# Patient Record
Sex: MALE | Race: WHITE | Employment: OTHER | URBAN - METROPOLITAN AREA
[De-identification: names, ages, dates, MRNs, and addresses within clinical notes are randomized per-mention and may not be internally consistent; named-entity substitution may affect disease eponyms.]

---

## 2017-01-01 ENCOUNTER — APPOINTMENT (OUTPATIENT)
Dept: CT IMAGING | Age: 82
DRG: 682 | End: 2017-01-01
Attending: EMERGENCY MEDICINE
Payer: MEDICARE

## 2017-01-01 ENCOUNTER — APPOINTMENT (OUTPATIENT)
Dept: GENERAL RADIOLOGY | Age: 82
DRG: 682 | End: 2017-01-01
Attending: EMERGENCY MEDICINE
Payer: MEDICARE

## 2017-01-01 ENCOUNTER — HOSPITAL ENCOUNTER (INPATIENT)
Age: 82
LOS: 4 days | Discharge: HOSPICE | DRG: 682 | End: 2017-05-27
Attending: EMERGENCY MEDICINE | Admitting: INTERNAL MEDICINE
Payer: MEDICARE

## 2017-01-01 ENCOUNTER — HOSPITAL ENCOUNTER (INPATIENT)
Age: 82
LOS: 9 days | End: 2017-06-05
Attending: INTERNAL MEDICINE | Admitting: INTERNAL MEDICINE

## 2017-01-01 ENCOUNTER — HOSPICE ADMISSION (OUTPATIENT)
Dept: HOSPICE | Facility: HOSPICE | Age: 82
End: 2017-01-01
Payer: MEDICARE

## 2017-01-01 ENCOUNTER — APPOINTMENT (OUTPATIENT)
Dept: GENERAL RADIOLOGY | Age: 82
DRG: 682 | End: 2017-01-01
Attending: INTERNAL MEDICINE
Payer: MEDICARE

## 2017-01-01 VITALS
TEMPERATURE: 96.1 F | DIASTOLIC BLOOD PRESSURE: 83 MMHG | SYSTOLIC BLOOD PRESSURE: 139 MMHG | HEIGHT: 61 IN | BODY MASS INDEX: 24.55 KG/M2 | HEART RATE: 117 BPM | OXYGEN SATURATION: 96 % | RESPIRATION RATE: 18 BRPM | WEIGHT: 130 LBS

## 2017-01-01 VITALS
TEMPERATURE: 97.1 F | SYSTOLIC BLOOD PRESSURE: 88 MMHG | HEART RATE: 83 BPM | DIASTOLIC BLOOD PRESSURE: 48 MMHG | RESPIRATION RATE: 28 BRPM

## 2017-01-01 DIAGNOSIS — R40.0 SOMNOLENCE: Primary | ICD-10-CM

## 2017-01-01 DIAGNOSIS — I48.19 PERSISTENT ATRIAL FIBRILLATION (HCC): ICD-10-CM

## 2017-01-01 DIAGNOSIS — N17.9 AKI (ACUTE KIDNEY INJURY) (HCC): ICD-10-CM

## 2017-01-01 DIAGNOSIS — R73.9 HYPERGLYCEMIA: ICD-10-CM

## 2017-01-01 DIAGNOSIS — D50.9 IRON DEFICIENCY ANEMIA, UNSPECIFIED IRON DEFICIENCY ANEMIA TYPE: ICD-10-CM
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
## 2017-01-01 DIAGNOSIS — F05 DELIRIUM DUE TO ANOTHER MEDICAL CONDITION, ACUTE, MIXED LEVEL OF ACTIVITY: ICD-10-CM

## 2017-01-01 DIAGNOSIS — M54.50 ACUTE MIDLINE LOW BACK PAIN WITHOUT SCIATICA: ICD-10-CM

## 2017-01-01 DIAGNOSIS — S32.000A LUMBAR COMPRESSION FRACTURE, CLOSED, INITIAL ENCOUNTER (HCC): ICD-10-CM

## 2017-01-01 LAB
ALBUMIN SERPL BCP-MCNC: 3.1 G/DL (ref 3.2–4.6)
ALBUMIN/GLOB SERPL: 0.7 {RATIO} (ref 1.2–3.5)
ALP SERPL-CCNC: 276 U/L (ref 50–136)
ALT SERPL-CCNC: 16 U/L (ref 12–65)
ANION GAP BLD CALC-SCNC: 10 MMOL/L (ref 7–16)
ANION GAP BLD CALC-SCNC: 13 MMOL/L (ref 7–16)
ANION GAP BLD CALC-SCNC: 18 MMOL/L (ref 7–16)
ANION GAP BLD CALC-SCNC: 21 MMOL/L (ref 7–16)
APPEARANCE UR: ABNORMAL
AST SERPL W P-5'-P-CCNC: 17 U/L (ref 15–37)
BACTERIA SPEC CULT: NORMAL
BACTERIA URNS QL MICRO: ABNORMAL /HPF
BASOPHILS # BLD AUTO: 0 K/UL (ref 0–0.2)
BASOPHILS # BLD AUTO: 0 K/UL (ref 0–0.2)
BASOPHILS # BLD: 0 % (ref 0–2)
BASOPHILS # BLD: 0 % (ref 0–2)
BILIRUB DIRECT SERPL-MCNC: 0.3 MG/DL
BILIRUB SERPL-MCNC: 0.9 MG/DL (ref 0.2–1.1)
BILIRUB UR QL: ABNORMAL
BUN SERPL-MCNC: 26 MG/DL (ref 8–23)
BUN SERPL-MCNC: 28 MG/DL (ref 8–23)
BUN SERPL-MCNC: 37 MG/DL (ref 8–23)
BUN SERPL-MCNC: 46 MG/DL (ref 8–23)
CALCIUM SERPL-MCNC: 8.6 MG/DL (ref 8.3–10.4)
CALCIUM SERPL-MCNC: 9 MG/DL (ref 8.3–10.4)
CALCIUM SERPL-MCNC: 9.7 MG/DL (ref 8.3–10.4)
CALCIUM SERPL-MCNC: 9.7 MG/DL (ref 8.3–10.4)
CASTS URNS QL MICRO: ABNORMAL /LPF
CHLORIDE SERPL-SCNC: 105 MMOL/L (ref 98–107)
CHLORIDE SERPL-SCNC: 106 MMOL/L (ref 98–107)
CHLORIDE SERPL-SCNC: 109 MMOL/L (ref 98–107)
CHLORIDE SERPL-SCNC: 99 MMOL/L (ref 98–107)
CO2 SERPL-SCNC: 18 MMOL/L (ref 21–32)
CO2 SERPL-SCNC: 20 MMOL/L (ref 21–32)
CO2 SERPL-SCNC: 25 MMOL/L (ref 21–32)
CO2 SERPL-SCNC: 30 MMOL/L (ref 21–32)
COLOR UR: ABNORMAL
CREAT SERPL-MCNC: 1.46 MG/DL (ref 0.8–1.5)
CREAT SERPL-MCNC: 1.76 MG/DL (ref 0.8–1.5)
CREAT SERPL-MCNC: 2.18 MG/DL (ref 0.8–1.5)
CREAT SERPL-MCNC: 2.29 MG/DL (ref 0.8–1.5)
DIFFERENTIAL METHOD BLD: ABNORMAL
DIFFERENTIAL METHOD BLD: ABNORMAL
DIGOXIN SERPL-MCNC: 1.2 NG/ML (ref 0.9–2.1)
EOSINOPHIL # BLD: 0 K/UL (ref 0–0.8)
EOSINOPHIL # BLD: 0 K/UL (ref 0–0.8)
EOSINOPHIL NFR BLD: 0 % (ref 0.5–7.8)
EOSINOPHIL NFR BLD: 0 % (ref 0.5–7.8)
EPI CELLS #/AREA URNS HPF: ABNORMAL /HPF
ERYTHROCYTE [DISTWIDTH] IN BLOOD BY AUTOMATED COUNT: 13.3 % (ref 11.9–14.6)
ERYTHROCYTE [DISTWIDTH] IN BLOOD BY AUTOMATED COUNT: 13.9 % (ref 11.9–14.6)
ERYTHROCYTE [DISTWIDTH] IN BLOOD BY AUTOMATED COUNT: 14.2 % (ref 11.9–14.6)
ERYTHROCYTE [DISTWIDTH] IN BLOOD BY AUTOMATED COUNT: 14.4 % (ref 11.9–14.6)
EST. AVERAGE GLUCOSE BLD GHB EST-MCNC: 200 MG/DL
GLOBULIN SER CALC-MCNC: 4.3 G/DL (ref 2.3–3.5)
GLUCOSE BLD STRIP.AUTO-MCNC: 116 MG/DL (ref 65–100)
GLUCOSE BLD STRIP.AUTO-MCNC: 146 MG/DL (ref 65–100)
GLUCOSE BLD STRIP.AUTO-MCNC: 153 MG/DL (ref 65–100)
GLUCOSE BLD STRIP.AUTO-MCNC: 154 MG/DL (ref 65–100)
GLUCOSE BLD STRIP.AUTO-MCNC: 164 MG/DL (ref 65–100)
GLUCOSE BLD STRIP.AUTO-MCNC: 169 MG/DL (ref 65–100)
GLUCOSE BLD STRIP.AUTO-MCNC: 198 MG/DL (ref 65–100)
GLUCOSE BLD STRIP.AUTO-MCNC: 200 MG/DL (ref 65–100)
GLUCOSE BLD STRIP.AUTO-MCNC: 200 MG/DL (ref 65–100)
GLUCOSE BLD STRIP.AUTO-MCNC: 207 MG/DL (ref 65–100)
GLUCOSE BLD STRIP.AUTO-MCNC: 231 MG/DL (ref 65–100)
GLUCOSE BLD STRIP.AUTO-MCNC: 233 MG/DL (ref 65–100)
GLUCOSE BLD STRIP.AUTO-MCNC: 259 MG/DL (ref 65–100)
GLUCOSE BLD STRIP.AUTO-MCNC: 270 MG/DL (ref 65–100)
GLUCOSE BLD STRIP.AUTO-MCNC: 325 MG/DL (ref 65–100)
GLUCOSE BLD STRIP.AUTO-MCNC: 92 MG/DL (ref 65–100)
GLUCOSE SERPL-MCNC: 139 MG/DL (ref 65–100)
GLUCOSE SERPL-MCNC: 171 MG/DL (ref 65–100)
GLUCOSE SERPL-MCNC: 212 MG/DL (ref 65–100)
GLUCOSE SERPL-MCNC: 343 MG/DL (ref 65–100)
GLUCOSE UR STRIP.AUTO-MCNC: 100 MG/DL
HBA1C MFR BLD: 8.6 % (ref 4.8–6)
HCT VFR BLD AUTO: 28.7 % (ref 41.1–50.3)
HCT VFR BLD AUTO: 29.2 % (ref 41.1–50.3)
HCT VFR BLD AUTO: 30.9 % (ref 41.1–50.3)
HCT VFR BLD AUTO: 33.4 % (ref 41.1–50.3)
HGB BLD-MCNC: 10.1 G/DL (ref 13.6–17.2)
HGB BLD-MCNC: 10.7 G/DL (ref 13.6–17.2)
HGB BLD-MCNC: 9.4 G/DL (ref 13.6–17.2)
HGB BLD-MCNC: 9.6 G/DL (ref 13.6–17.2)
HGB UR QL STRIP: ABNORMAL
IMM GRANULOCYTES # BLD: 0.1 K/UL (ref 0–0.5)
IMM GRANULOCYTES # BLD: 0.1 K/UL (ref 0–0.5)
IMM GRANULOCYTES NFR BLD AUTO: 0.7 % (ref 0–5)
IMM GRANULOCYTES NFR BLD AUTO: 0.9 % (ref 0–5)
KETONES UR QL STRIP.AUTO: 15 MG/DL
LACTATE BLD-SCNC: 1.4 MMOL/L (ref 0.5–1.9)
LEUKOCYTE ESTERASE UR QL STRIP.AUTO: ABNORMAL
LYMPHOCYTES # BLD AUTO: 9 % (ref 13–44)
LYMPHOCYTES # BLD AUTO: 9 % (ref 13–44)
LYMPHOCYTES # BLD: 1 K/UL (ref 0.5–4.6)
LYMPHOCYTES # BLD: 1 K/UL (ref 0.5–4.6)
MCH RBC QN AUTO: 33.5 PG (ref 26.1–32.9)
MCH RBC QN AUTO: 33.7 PG (ref 26.1–32.9)
MCH RBC QN AUTO: 33.7 PG (ref 26.1–32.9)
MCH RBC QN AUTO: 34.1 PG (ref 26.1–32.9)
MCHC RBC AUTO-ENTMCNC: 32 G/DL (ref 31.4–35)
MCHC RBC AUTO-ENTMCNC: 32.7 G/DL (ref 31.4–35)
MCHC RBC AUTO-ENTMCNC: 32.8 G/DL (ref 31.4–35)
MCHC RBC AUTO-ENTMCNC: 32.9 G/DL (ref 31.4–35)
MCV RBC AUTO: 102.5 FL (ref 79.6–97.8)
MCV RBC AUTO: 103 FL (ref 79.6–97.8)
MCV RBC AUTO: 104 FL (ref 79.6–97.8)
MCV RBC AUTO: 104.7 FL (ref 79.6–97.8)
MM INDURATION POC: 0 MM (ref 0–5)
MM INDURATION POC: 0 MM (ref 0–5)
MONOCYTES # BLD: 1.4 K/UL (ref 0.1–1.3)
MONOCYTES # BLD: 1.5 K/UL (ref 0.1–1.3)
MONOCYTES NFR BLD AUTO: 12 % (ref 4–12)
MONOCYTES NFR BLD AUTO: 14 % (ref 4–12)
MUCOUS THREADS URNS QL MICRO: ABNORMAL /LPF
NEUTS SEG # BLD: 8 K/UL (ref 1.7–8.2)
NEUTS SEG # BLD: 9.4 K/UL (ref 1.7–8.2)
NEUTS SEG NFR BLD AUTO: 76 % (ref 43–78)
NEUTS SEG NFR BLD AUTO: 78 % (ref 43–78)
NITRITE UR QL STRIP.AUTO: NEGATIVE
PH UR STRIP: 5 [PH] (ref 5–9)
PLATELET # BLD AUTO: 228 K/UL (ref 150–450)
PLATELET # BLD AUTO: 257 K/UL (ref 150–450)
PLATELET # BLD AUTO: 287 K/UL (ref 150–450)
PLATELET # BLD AUTO: 335 K/UL (ref 150–450)
PMV BLD AUTO: 10.3 FL (ref 10.8–14.1)
PMV BLD AUTO: 10.5 FL (ref 10.8–14.1)
PMV BLD AUTO: 10.5 FL (ref 10.8–14.1)
PMV BLD AUTO: 10.7 FL (ref 10.8–14.1)
POTASSIUM SERPL-SCNC: 3.4 MMOL/L (ref 3.5–5.1)
POTASSIUM SERPL-SCNC: 4.1 MMOL/L (ref 3.5–5.1)
POTASSIUM SERPL-SCNC: 4.3 MMOL/L (ref 3.5–5.1)
POTASSIUM SERPL-SCNC: 4.8 MMOL/L (ref 3.5–5.1)
PPD POC: NEGATIVE NEGATIVE
PPD POC: NORMAL NEGATIVE
PROT SERPL-MCNC: 7.4 G/DL (ref 6.3–8.2)
PROT UR STRIP-MCNC: 100 MG/DL
RBC # BLD AUTO: 2.76 M/UL (ref 4.23–5.67)
RBC # BLD AUTO: 2.85 M/UL (ref 4.23–5.67)
RBC # BLD AUTO: 3 M/UL (ref 4.23–5.67)
RBC # BLD AUTO: 3.19 M/UL (ref 4.23–5.67)
RBC #/AREA URNS HPF: ABNORMAL /HPF
SERVICE CMNT-IMP: NORMAL
SODIUM SERPL-SCNC: 139 MMOL/L (ref 136–145)
SODIUM SERPL-SCNC: 144 MMOL/L (ref 136–145)
SODIUM SERPL-SCNC: 144 MMOL/L (ref 136–145)
SODIUM SERPL-SCNC: 147 MMOL/L (ref 136–145)
SP GR UR REFRACTOMETRY: 1.03 (ref 1–1.02)
UROBILINOGEN UR QL STRIP.AUTO: 1 EU/DL (ref 0.2–1)
WBC # BLD AUTO: 10.6 K/UL (ref 4.3–11.1)
WBC # BLD AUTO: 12 K/UL (ref 4.3–11.1)
WBC # BLD AUTO: 13 K/UL (ref 4.3–11.1)
WBC # BLD AUTO: 13.2 K/UL (ref 4.3–11.1)
WBC URNS QL MICRO: ABNORMAL /HPF

## 2017-01-01 PROCEDURE — 82962 GLUCOSE BLOOD TEST: CPT

## 2017-01-01 PROCEDURE — 74011250636 HC RX REV CODE- 250/636: Performed by: INTERNAL MEDICINE

## 2017-01-01 PROCEDURE — 77030019605

## 2017-01-01 PROCEDURE — 74011000250 HC RX REV CODE- 250: Performed by: NURSE PRACTITIONER

## 2017-01-01 PROCEDURE — 0656 HSPC GENERAL INPATIENT

## 2017-01-01 PROCEDURE — 74011000258 HC RX REV CODE- 258: Performed by: NURSE PRACTITIONER

## 2017-01-01 PROCEDURE — 85027 COMPLETE CBC AUTOMATED: CPT | Performed by: EMERGENCY MEDICINE

## 2017-01-01 PROCEDURE — 65270000029 HC RM PRIVATE

## 2017-01-01 PROCEDURE — 85027 COMPLETE CBC AUTOMATED: CPT | Performed by: PHYSICIAN ASSISTANT

## 2017-01-01 PROCEDURE — 77030011256 HC DRSG MEPILEX <16IN NO BORD MOLN -A

## 2017-01-01 PROCEDURE — 0T9B70Z DRAINAGE OF BLADDER WITH DRAINAGE DEVICE, VIA NATURAL OR ARTIFICIAL OPENING: ICD-10-PCS | Performed by: INTERNAL MEDICINE

## 2017-01-01 PROCEDURE — 83605 ASSAY OF LACTIC ACID: CPT

## 2017-01-01 PROCEDURE — 83036 HEMOGLOBIN GLYCOSYLATED A1C: CPT | Performed by: INTERNAL MEDICINE

## 2017-01-01 PROCEDURE — 74011000258 HC RX REV CODE- 258: Performed by: INTERNAL MEDICINE

## 2017-01-01 PROCEDURE — 74011250636 HC RX REV CODE- 250/636: Performed by: NURSE PRACTITIONER

## 2017-01-01 PROCEDURE — 74011250636 HC RX REV CODE- 250/636: Performed by: EMERGENCY MEDICINE

## 2017-01-01 PROCEDURE — 99284 EMERGENCY DEPT VISIT MOD MDM: CPT | Performed by: EMERGENCY MEDICINE

## 2017-01-01 PROCEDURE — 74011250637 HC RX REV CODE- 250/637: Performed by: INTERNAL MEDICINE

## 2017-01-01 PROCEDURE — 74011250637 HC RX REV CODE- 250/637: Performed by: HOSPITALIST

## 2017-01-01 PROCEDURE — 77030034849

## 2017-01-01 PROCEDURE — 97162 PT EVAL MOD COMPLEX 30 MIN: CPT

## 2017-01-01 PROCEDURE — 80048 BASIC METABOLIC PNL TOTAL CA: CPT | Performed by: INTERNAL MEDICINE

## 2017-01-01 PROCEDURE — 77030018846 HC SOL IRR STRL H20 ICUM -A

## 2017-01-01 PROCEDURE — 74011000302 HC RX REV CODE- 302: Performed by: INTERNAL MEDICINE

## 2017-01-01 PROCEDURE — 74011250636 HC RX REV CODE- 250/636: Performed by: HOSPITALIST

## 2017-01-01 PROCEDURE — 80048 BASIC METABOLIC PNL TOTAL CA: CPT | Performed by: NURSE PRACTITIONER

## 2017-01-01 PROCEDURE — 80076 HEPATIC FUNCTION PANEL: CPT | Performed by: INTERNAL MEDICINE

## 2017-01-01 PROCEDURE — 85025 COMPLETE CBC W/AUTO DIFF WBC: CPT | Performed by: INTERNAL MEDICINE

## 2017-01-01 PROCEDURE — 70450 CT HEAD/BRAIN W/O DYE: CPT

## 2017-01-01 PROCEDURE — 71010 XR CHEST PORT: CPT

## 2017-01-01 PROCEDURE — 94760 N-INVAS EAR/PLS OXIMETRY 1: CPT

## 2017-01-01 PROCEDURE — 72100 X-RAY EXAM L-S SPINE 2/3 VWS: CPT

## 2017-01-01 PROCEDURE — 85025 COMPLETE CBC W/AUTO DIFF WBC: CPT | Performed by: NURSE PRACTITIONER

## 2017-01-01 PROCEDURE — 86580 TB INTRADERMAL TEST: CPT | Performed by: INTERNAL MEDICINE

## 2017-01-01 PROCEDURE — 36415 COLL VENOUS BLD VENIPUNCTURE: CPT | Performed by: PHYSICIAN ASSISTANT

## 2017-01-01 PROCEDURE — 97535 SELF CARE MNGMENT TRAINING: CPT

## 2017-01-01 PROCEDURE — 80048 BASIC METABOLIC PNL TOTAL CA: CPT | Performed by: PHYSICIAN ASSISTANT

## 2017-01-01 PROCEDURE — 36415 COLL VENOUS BLD VENIPUNCTURE: CPT | Performed by: NURSE PRACTITIONER

## 2017-01-01 PROCEDURE — 81001 URINALYSIS AUTO W/SCOPE: CPT | Performed by: INTERNAL MEDICINE

## 2017-01-01 PROCEDURE — 80162 ASSAY OF DIGOXIN TOTAL: CPT | Performed by: INTERNAL MEDICINE

## 2017-01-01 PROCEDURE — 74011636637 HC RX REV CODE- 636/637: Performed by: INTERNAL MEDICINE

## 2017-01-01 PROCEDURE — 80048 BASIC METABOLIC PNL TOTAL CA: CPT | Performed by: EMERGENCY MEDICINE

## 2017-01-01 PROCEDURE — 99221 1ST HOSP IP/OBS SF/LOW 40: CPT | Performed by: INTERNAL MEDICINE

## 2017-01-01 PROCEDURE — 72170 X-RAY EXAM OF PELVIS: CPT

## 2017-01-01 PROCEDURE — 96360 HYDRATION IV INFUSION INIT: CPT | Performed by: EMERGENCY MEDICINE

## 2017-01-01 PROCEDURE — 97165 OT EVAL LOW COMPLEX 30 MIN: CPT

## 2017-01-01 PROCEDURE — 87086 URINE CULTURE/COLONY COUNT: CPT | Performed by: NURSE PRACTITIONER

## 2017-01-01 PROCEDURE — 77030032490 HC SLV COMPR SCD KNE COVD -B

## 2017-01-01 PROCEDURE — 3336500001 HSPC ELECTION

## 2017-01-01 PROCEDURE — 36415 COLL VENOUS BLD VENIPUNCTURE: CPT | Performed by: INTERNAL MEDICINE

## 2017-01-01 PROCEDURE — 92610 EVALUATE SWALLOWING FUNCTION: CPT

## 2017-01-01 PROCEDURE — 74011250636 HC RX REV CODE- 250/636: Performed by: PHYSICIAN ASSISTANT

## 2017-01-01 RX ORDER — ONDANSETRON 2 MG/ML
4 INJECTION INTRAMUSCULAR; INTRAVENOUS
Status: DISCONTINUED | OUTPATIENT
Start: 2017-01-01 | End: 2017-01-01 | Stop reason: HOSPADM

## 2017-01-01 RX ORDER — HALOPERIDOL 5 MG/ML
2 INJECTION INTRAMUSCULAR EVERY 8 HOURS
Status: DISCONTINUED | OUTPATIENT
Start: 2017-01-01 | End: 2017-06-06 | Stop reason: HOSPADM

## 2017-01-01 RX ORDER — MORPHINE SULFATE 4 MG/ML
4 INJECTION, SOLUTION INTRAMUSCULAR; INTRAVENOUS
Status: DISCONTINUED | OUTPATIENT
Start: 2017-01-01 | End: 2017-06-06 | Stop reason: HOSPADM

## 2017-01-01 RX ORDER — MELATONIN
1000 DAILY
Status: DISCONTINUED | OUTPATIENT
Start: 2017-01-01 | End: 2017-01-01 | Stop reason: HOSPADM

## 2017-01-01 RX ORDER — SODIUM CHLORIDE 0.9 % (FLUSH) 0.9 %
3 SYRINGE (ML) INJECTION EVERY 12 HOURS
Status: DISCONTINUED | OUTPATIENT
Start: 2017-01-01 | End: 2017-01-01

## 2017-01-01 RX ORDER — DEXTROSE MONOHYDRATE 50 MG/ML
75 INJECTION, SOLUTION INTRAVENOUS CONTINUOUS
Status: DISCONTINUED | OUTPATIENT
Start: 2017-01-01 | End: 2017-01-01

## 2017-01-01 RX ORDER — SODIUM CHLORIDE 9 MG/ML
125 INJECTION, SOLUTION INTRAVENOUS CONTINUOUS
Status: DISCONTINUED | OUTPATIENT
Start: 2017-01-01 | End: 2017-01-01

## 2017-01-01 RX ORDER — HALOPERIDOL 5 MG/ML
2 INJECTION INTRAMUSCULAR
Status: DISCONTINUED | OUTPATIENT
Start: 2017-01-01 | End: 2017-06-06 | Stop reason: HOSPADM

## 2017-01-01 RX ORDER — FERROUS GLUCONATE 324(38)MG
1 TABLET ORAL
Status: DISCONTINUED | OUTPATIENT
Start: 2017-01-01 | End: 2017-01-01 | Stop reason: HOSPADM

## 2017-01-01 RX ORDER — LORAZEPAM 2 MG/ML
1 INJECTION INTRAMUSCULAR
Status: DISCONTINUED | OUTPATIENT
Start: 2017-01-01 | End: 2017-06-06 | Stop reason: HOSPADM

## 2017-01-01 RX ORDER — QUETIAPINE FUMARATE 25 MG/1
25 TABLET, FILM COATED ORAL ONCE
Status: COMPLETED | OUTPATIENT
Start: 2017-01-01 | End: 2017-01-01

## 2017-01-01 RX ORDER — DILTIAZEM HYDROCHLORIDE 120 MG/1
120 CAPSULE, COATED, EXTENDED RELEASE ORAL DAILY
Status: DISCONTINUED | OUTPATIENT
Start: 2017-01-01 | End: 2017-01-01 | Stop reason: HOSPADM

## 2017-01-01 RX ORDER — HALOPERIDOL 5 MG/ML
2 INJECTION INTRAMUSCULAR ONCE
Status: COMPLETED | OUTPATIENT
Start: 2017-01-01 | End: 2017-01-01

## 2017-01-01 RX ORDER — DEXTROSE MONOHYDRATE AND SODIUM CHLORIDE 5; .45 G/100ML; G/100ML
75 INJECTION, SOLUTION INTRAVENOUS CONTINUOUS
Status: DISCONTINUED | OUTPATIENT
Start: 2017-01-01 | End: 2017-01-01 | Stop reason: HOSPADM

## 2017-01-01 RX ORDER — MORPHINE SULFATE 4 MG/ML
4 INJECTION, SOLUTION INTRAMUSCULAR; INTRAVENOUS
Status: DISCONTINUED | OUTPATIENT
Start: 2017-01-01 | End: 2017-01-01

## 2017-01-01 RX ORDER — HALOPERIDOL 5 MG/ML
2 INJECTION INTRAMUSCULAR
Status: DISCONTINUED | OUTPATIENT
Start: 2017-01-01 | End: 2017-01-01

## 2017-01-01 RX ORDER — HYDROMORPHONE HYDROCHLORIDE 1 MG/ML
1 INJECTION, SOLUTION INTRAMUSCULAR; INTRAVENOUS; SUBCUTANEOUS ONCE
Status: COMPLETED | OUTPATIENT
Start: 2017-01-01 | End: 2017-01-01

## 2017-01-01 RX ORDER — SODIUM CHLORIDE 0.9 % (FLUSH) 0.9 %
5-10 SYRINGE (ML) INJECTION AS NEEDED
Status: DISCONTINUED | OUTPATIENT
Start: 2017-01-01 | End: 2017-01-01 | Stop reason: HOSPADM

## 2017-01-01 RX ORDER — ACETAMINOPHEN 500 MG
500 TABLET ORAL
Status: DISCONTINUED | OUTPATIENT
Start: 2017-01-01 | End: 2017-01-01 | Stop reason: HOSPADM

## 2017-01-01 RX ORDER — GLYCOPYRROLATE 0.2 MG/ML
0.2 INJECTION INTRAMUSCULAR; INTRAVENOUS
Status: DISCONTINUED | OUTPATIENT
Start: 2017-01-01 | End: 2017-06-06 | Stop reason: HOSPADM

## 2017-01-01 RX ORDER — DIPHENHYDRAMINE HYDROCHLORIDE 50 MG/ML
25 INJECTION, SOLUTION INTRAMUSCULAR; INTRAVENOUS
Status: DISCONTINUED | OUTPATIENT
Start: 2017-01-01 | End: 2017-01-01

## 2017-01-01 RX ORDER — FACIAL-BODY WIPES
10 EACH TOPICAL AS NEEDED
Status: DISCONTINUED | OUTPATIENT
Start: 2017-01-01 | End: 2017-06-06 | Stop reason: HOSPADM

## 2017-01-01 RX ORDER — MORPHINE SULFATE 2 MG/ML
2 INJECTION, SOLUTION INTRAMUSCULAR; INTRAVENOUS
Status: DISCONTINUED | OUTPATIENT
Start: 2017-01-01 | End: 2017-01-01

## 2017-01-01 RX ORDER — BISACODYL 5 MG
5 TABLET, DELAYED RELEASE (ENTERIC COATED) ORAL DAILY PRN
Status: DISCONTINUED | OUTPATIENT
Start: 2017-01-01 | End: 2017-01-01 | Stop reason: HOSPADM

## 2017-01-01 RX ORDER — DIGOXIN 125 MCG
0.12 TABLET ORAL DAILY
Status: DISCONTINUED | OUTPATIENT
Start: 2017-01-01 | End: 2017-01-01 | Stop reason: HOSPADM

## 2017-01-01 RX ORDER — POTASSIUM CHLORIDE 14.9 MG/ML
20 INJECTION INTRAVENOUS
Status: COMPLETED | OUTPATIENT
Start: 2017-01-01 | End: 2017-01-01

## 2017-01-01 RX ORDER — FAMOTIDINE 20 MG/1
20 TABLET, FILM COATED ORAL 2 TIMES DAILY
Status: DISCONTINUED | OUTPATIENT
Start: 2017-01-01 | End: 2017-01-01 | Stop reason: HOSPADM

## 2017-01-01 RX ORDER — SODIUM CHLORIDE 0.9 % (FLUSH) 0.9 %
5-10 SYRINGE (ML) INJECTION EVERY 8 HOURS
Status: DISCONTINUED | OUTPATIENT
Start: 2017-01-01 | End: 2017-01-01 | Stop reason: HOSPADM

## 2017-01-01 RX ORDER — TRAMADOL HYDROCHLORIDE 50 MG/1
50 TABLET ORAL
Status: DISCONTINUED | OUTPATIENT
Start: 2017-01-01 | End: 2017-01-01 | Stop reason: HOSPADM

## 2017-01-01 RX ORDER — CALCIPOTRIENE 50 UG/G
OINTMENT TOPICAL 2 TIMES DAILY
Status: DISCONTINUED | OUTPATIENT
Start: 2017-01-01 | End: 2017-01-01 | Stop reason: HOSPADM

## 2017-01-01 RX ORDER — MIRTAZAPINE 15 MG/1
15 TABLET, FILM COATED ORAL
COMMUNITY

## 2017-01-01 RX ORDER — ASPIRIN 325 MG
325 TABLET, DELAYED RELEASE (ENTERIC COATED) ORAL DAILY
Status: DISCONTINUED | OUTPATIENT
Start: 2017-01-01 | End: 2017-01-01 | Stop reason: HOSPADM

## 2017-01-01 RX ORDER — HALOPERIDOL 5 MG/ML
0.5 INJECTION INTRAMUSCULAR
Status: DISCONTINUED | OUTPATIENT
Start: 2017-01-01 | End: 2017-01-01 | Stop reason: HOSPADM

## 2017-01-01 RX ORDER — LANOLIN ALCOHOL/MO/W.PET/CERES
1000 CREAM (GRAM) TOPICAL DAILY
Status: DISCONTINUED | OUTPATIENT
Start: 2017-01-01 | End: 2017-01-01 | Stop reason: HOSPADM

## 2017-01-01 RX ORDER — SODIUM CHLORIDE 9 MG/ML
75 INJECTION, SOLUTION INTRAVENOUS CONTINUOUS
Status: DISPENSED | OUTPATIENT
Start: 2017-01-01 | End: 2017-01-01

## 2017-01-01 RX ORDER — INSULIN LISPRO 100 [IU]/ML
INJECTION, SOLUTION INTRAVENOUS; SUBCUTANEOUS
Status: DISCONTINUED | OUTPATIENT
Start: 2017-01-01 | End: 2017-01-01 | Stop reason: HOSPADM

## 2017-01-01 RX ORDER — CEFPODOXIME PROXETIL 200 MG/1
200 TABLET, FILM COATED ORAL 2 TIMES DAILY
Qty: 2 TAB | Refills: 0 | Status: SHIPPED | OUTPATIENT
Start: 2017-01-01

## 2017-01-01 RX ORDER — ACETAMINOPHEN 650 MG/1
650 SUPPOSITORY RECTAL
Status: DISCONTINUED | OUTPATIENT
Start: 2017-01-01 | End: 2017-06-06 | Stop reason: HOSPADM

## 2017-01-01 RX ORDER — POLYETHYLENE GLYCOL 3350 17 G/17G
17 POWDER, FOR SOLUTION ORAL DAILY
Status: DISCONTINUED | OUTPATIENT
Start: 2017-01-01 | End: 2017-01-01 | Stop reason: HOSPADM

## 2017-01-01 RX ORDER — LORAZEPAM 1 MG/1
1 TABLET ORAL
Status: DISCONTINUED | OUTPATIENT
Start: 2017-01-01 | End: 2017-06-06 | Stop reason: HOSPADM

## 2017-01-01 RX ADMIN — FAMOTIDINE 20 MG: 20 TABLET ORAL at 08:18

## 2017-01-01 RX ADMIN — GLYCOPYRROLATE 0.2 MG: 0.2 INJECTION INTRAMUSCULAR; INTRAVENOUS at 17:52

## 2017-01-01 RX ADMIN — MORPHINE SULFATE 2 MG: 2 INJECTION, SOLUTION INTRAMUSCULAR; INTRAVENOUS at 12:38

## 2017-01-01 RX ADMIN — MORPHINE SULFATE 4 MG: 4 INJECTION, SOLUTION INTRAMUSCULAR; INTRAVENOUS at 14:01

## 2017-01-01 RX ADMIN — Medication 10 ML: at 05:46

## 2017-01-01 RX ADMIN — MORPHINE SULFATE 2 MG: 2 INJECTION, SOLUTION INTRAMUSCULAR; INTRAVENOUS at 08:32

## 2017-01-01 RX ADMIN — VITAMIN D, TAB 1000IU (100/BT) 1000 UNITS: 25 TAB at 08:18

## 2017-01-01 RX ADMIN — MORPHINE SULFATE 2 MG: 2 INJECTION, SOLUTION INTRAMUSCULAR; INTRAVENOUS at 10:21

## 2017-01-01 RX ADMIN — DILTIAZEM HYDROCHLORIDE 120 MG: 120 CAPSULE, COATED, EXTENDED RELEASE ORAL at 08:36

## 2017-01-01 RX ADMIN — MORPHINE SULFATE 2 MG: 2 INJECTION, SOLUTION INTRAMUSCULAR; INTRAVENOUS at 15:46

## 2017-01-01 RX ADMIN — CYANOCOBALAMIN TAB 1000 MCG 1000 MCG: 1000 TAB at 08:37

## 2017-01-01 RX ADMIN — HALOPERIDOL LACTATE 2 MG: 5 INJECTION, SOLUTION INTRAMUSCULAR at 08:33

## 2017-01-01 RX ADMIN — SODIUM CHLORIDE 125 ML/HR: 900 INJECTION, SOLUTION INTRAVENOUS at 22:43

## 2017-01-01 RX ADMIN — HALOPERIDOL LACTATE 2 MG: 5 INJECTION, SOLUTION INTRAMUSCULAR at 06:51

## 2017-01-01 RX ADMIN — CEFTRIAXONE 1 G: 1 INJECTION, POWDER, FOR SOLUTION INTRAMUSCULAR; INTRAVENOUS at 12:02

## 2017-01-01 RX ADMIN — HALOPERIDOL LACTATE 0.5 MG: 5 INJECTION, SOLUTION INTRAMUSCULAR at 03:32

## 2017-01-01 RX ADMIN — CYANOCOBALAMIN TAB 1000 MCG 1000 MCG: 1000 TAB at 08:18

## 2017-01-01 RX ADMIN — Medication 5 ML: at 14:00

## 2017-01-01 RX ADMIN — HALOPERIDOL LACTATE 2 MG: 5 INJECTION, SOLUTION INTRAMUSCULAR at 22:00

## 2017-01-01 RX ADMIN — INSULIN LISPRO 4 UNITS: 100 INJECTION, SOLUTION INTRAVENOUS; SUBCUTANEOUS at 12:05

## 2017-01-01 RX ADMIN — CEFTRIAXONE 1 G: 1 INJECTION, POWDER, FOR SOLUTION INTRAMUSCULAR; INTRAVENOUS at 11:17

## 2017-01-01 RX ADMIN — MORPHINE SULFATE 2 MG: 2 INJECTION, SOLUTION INTRAMUSCULAR; INTRAVENOUS at 20:01

## 2017-01-01 RX ADMIN — ACETAMINOPHEN 500 MG: 500 TABLET, FILM COATED ORAL at 08:36

## 2017-01-01 RX ADMIN — HALOPERIDOL LACTATE 0.5 MG: 5 INJECTION, SOLUTION INTRAMUSCULAR at 19:22

## 2017-01-01 RX ADMIN — MORPHINE SULFATE 2 MG: 2 INJECTION, SOLUTION INTRAMUSCULAR; INTRAVENOUS at 17:48

## 2017-01-01 RX ADMIN — MORPHINE SULFATE 2 MG: 2 INJECTION, SOLUTION INTRAMUSCULAR; INTRAVENOUS at 22:12

## 2017-01-01 RX ADMIN — HALOPERIDOL LACTATE 2 MG: 5 INJECTION, SOLUTION INTRAMUSCULAR at 19:15

## 2017-01-01 RX ADMIN — VITAMIN D, TAB 1000IU (100/BT) 1000 UNITS: 25 TAB at 09:11

## 2017-01-01 RX ADMIN — HALOPERIDOL LACTATE 2 MG: 5 INJECTION, SOLUTION INTRAMUSCULAR at 14:49

## 2017-01-01 RX ADMIN — INSULIN LISPRO 2 UNITS: 100 INJECTION, SOLUTION INTRAVENOUS; SUBCUTANEOUS at 22:43

## 2017-01-01 RX ADMIN — MORPHINE SULFATE 2 MG: 2 INJECTION, SOLUTION INTRAMUSCULAR; INTRAVENOUS at 09:45

## 2017-01-01 RX ADMIN — INSULIN LISPRO 6 UNITS: 100 INJECTION, SOLUTION INTRAVENOUS; SUBCUTANEOUS at 08:38

## 2017-01-01 RX ADMIN — HALOPERIDOL LACTATE 2 MG: 5 INJECTION, SOLUTION INTRAMUSCULAR at 21:25

## 2017-01-01 RX ADMIN — DEXTROSE MONOHYDRATE 75 ML/HR: 5 INJECTION, SOLUTION INTRAVENOUS at 12:01

## 2017-01-01 RX ADMIN — HALOPERIDOL LACTATE 2 MG: 5 INJECTION, SOLUTION INTRAMUSCULAR at 03:23

## 2017-01-01 RX ADMIN — POTASSIUM CHLORIDE 20 MEQ: 200 INJECTION, SOLUTION INTRAVENOUS at 14:57

## 2017-01-01 RX ADMIN — INSULIN LISPRO 2 UNITS: 100 INJECTION, SOLUTION INTRAVENOUS; SUBCUTANEOUS at 11:30

## 2017-01-01 RX ADMIN — MORPHINE SULFATE 2 MG: 2 INJECTION, SOLUTION INTRAMUSCULAR; INTRAVENOUS at 11:24

## 2017-01-01 RX ADMIN — INSULIN LISPRO 4 UNITS: 100 INJECTION, SOLUTION INTRAVENOUS; SUBCUTANEOUS at 08:19

## 2017-01-01 RX ADMIN — Medication 10 ML: at 14:00

## 2017-01-01 RX ADMIN — HALOPERIDOL LACTATE 2 MG: 5 INJECTION, SOLUTION INTRAMUSCULAR at 10:07

## 2017-01-01 RX ADMIN — FAMOTIDINE 20 MG: 20 TABLET ORAL at 17:48

## 2017-01-01 RX ADMIN — TRAMADOL HYDROCHLORIDE 50 MG: 50 TABLET, FILM COATED ORAL at 15:01

## 2017-01-01 RX ADMIN — MORPHINE SULFATE 2 MG: 2 INJECTION, SOLUTION INTRAMUSCULAR; INTRAVENOUS at 03:23

## 2017-01-01 RX ADMIN — MORPHINE SULFATE 2 MG: 2 INJECTION, SOLUTION INTRAMUSCULAR; INTRAVENOUS at 03:37

## 2017-01-01 RX ADMIN — VITAMIN D, TAB 1000IU (100/BT) 1000 UNITS: 25 TAB at 08:37

## 2017-01-01 RX ADMIN — MORPHINE SULFATE 2 MG: 2 INJECTION, SOLUTION INTRAMUSCULAR; INTRAVENOUS at 03:52

## 2017-01-01 RX ADMIN — MORPHINE SULFATE 2 MG: 2 INJECTION, SOLUTION INTRAMUSCULAR; INTRAVENOUS at 00:43

## 2017-01-01 RX ADMIN — HALOPERIDOL LACTATE 2 MG: 5 INJECTION, SOLUTION INTRAMUSCULAR at 03:47

## 2017-01-01 RX ADMIN — MORPHINE SULFATE 2 MG: 2 INJECTION, SOLUTION INTRAMUSCULAR; INTRAVENOUS at 16:32

## 2017-01-01 RX ADMIN — TUBERCULIN PURIFIED PROTEIN DERIVATIVE 5 UNITS: 5 INJECTION, SOLUTION INTRADERMAL at 15:06

## 2017-01-01 RX ADMIN — FAMOTIDINE 20 MG: 20 TABLET ORAL at 17:19

## 2017-01-01 RX ADMIN — INSULIN LISPRO 4 UNITS: 100 INJECTION, SOLUTION INTRAVENOUS; SUBCUTANEOUS at 16:50

## 2017-01-01 RX ADMIN — MORPHINE SULFATE 2 MG: 2 INJECTION, SOLUTION INTRAMUSCULAR; INTRAVENOUS at 14:49

## 2017-01-01 RX ADMIN — VITAMIN D, TAB 1000IU (100/BT) 1000 UNITS: 25 TAB at 08:27

## 2017-01-01 RX ADMIN — MORPHINE SULFATE 4 MG: 4 INJECTION, SOLUTION INTRAMUSCULAR; INTRAVENOUS at 17:54

## 2017-01-01 RX ADMIN — MORPHINE SULFATE 2 MG: 2 INJECTION, SOLUTION INTRAMUSCULAR; INTRAVENOUS at 10:06

## 2017-01-01 RX ADMIN — MORPHINE SULFATE 2 MG: 2 INJECTION, SOLUTION INTRAMUSCULAR; INTRAVENOUS at 12:06

## 2017-01-01 RX ADMIN — INSULIN LISPRO 4 UNITS: 100 INJECTION, SOLUTION INTRAVENOUS; SUBCUTANEOUS at 22:13

## 2017-01-01 RX ADMIN — HALOPERIDOL LACTATE 2 MG: 5 INJECTION, SOLUTION INTRAMUSCULAR at 05:25

## 2017-01-01 RX ADMIN — MORPHINE SULFATE 2 MG: 2 INJECTION, SOLUTION INTRAMUSCULAR; INTRAVENOUS at 14:47

## 2017-01-01 RX ADMIN — LORAZEPAM 1 MG: 2 INJECTION INTRAMUSCULAR; INTRAVENOUS at 10:17

## 2017-01-01 RX ADMIN — HALOPERIDOL LACTATE 2 MG: 5 INJECTION, SOLUTION INTRAMUSCULAR at 00:42

## 2017-01-01 RX ADMIN — HALOPERIDOL LACTATE 0.5 MG: 5 INJECTION, SOLUTION INTRAMUSCULAR at 22:19

## 2017-01-01 RX ADMIN — HALOPERIDOL LACTATE 2 MG: 5 INJECTION, SOLUTION INTRAMUSCULAR at 09:45

## 2017-01-01 RX ADMIN — MORPHINE SULFATE 2 MG: 2 INJECTION, SOLUTION INTRAMUSCULAR; INTRAVENOUS at 04:25

## 2017-01-01 RX ADMIN — SODIUM CHLORIDE 125 ML/HR: 900 INJECTION, SOLUTION INTRAVENOUS at 12:43

## 2017-01-01 RX ADMIN — MORPHINE SULFATE 2 MG: 2 INJECTION, SOLUTION INTRAMUSCULAR; INTRAVENOUS at 10:40

## 2017-01-01 RX ADMIN — MORPHINE SULFATE 2 MG: 2 INJECTION, SOLUTION INTRAMUSCULAR; INTRAVENOUS at 12:59

## 2017-01-01 RX ADMIN — REGULAR STRENGTH 325 MG: 325 TABLET ORAL at 09:11

## 2017-01-01 RX ADMIN — DEXTROSE MONOHYDRATE AND SODIUM CHLORIDE 75 ML/HR: 5; .45 INJECTION, SOLUTION INTRAVENOUS at 03:30

## 2017-01-01 RX ADMIN — FAMOTIDINE 20 MG: 20 TABLET ORAL at 09:06

## 2017-01-01 RX ADMIN — MORPHINE SULFATE 2 MG: 2 INJECTION, SOLUTION INTRAMUSCULAR; INTRAVENOUS at 17:52

## 2017-01-01 RX ADMIN — MORPHINE SULFATE 2 MG: 2 INJECTION, SOLUTION INTRAMUSCULAR; INTRAVENOUS at 09:29

## 2017-01-01 RX ADMIN — MORPHINE SULFATE 2 MG: 2 INJECTION, SOLUTION INTRAMUSCULAR; INTRAVENOUS at 12:12

## 2017-01-01 RX ADMIN — FERROUS GLUCONATE 1 TABLET: 324 TABLET ORAL at 08:37

## 2017-01-01 RX ADMIN — MORPHINE SULFATE 2 MG: 2 INJECTION, SOLUTION INTRAMUSCULAR; INTRAVENOUS at 09:50

## 2017-01-01 RX ADMIN — SODIUM CHLORIDE 100 ML/HR: 900 INJECTION, SOLUTION INTRAVENOUS at 11:17

## 2017-01-01 RX ADMIN — HALOPERIDOL LACTATE 2 MG: 5 INJECTION, SOLUTION INTRAMUSCULAR at 14:09

## 2017-01-01 RX ADMIN — MORPHINE SULFATE 2 MG: 2 INJECTION, SOLUTION INTRAMUSCULAR; INTRAVENOUS at 11:07

## 2017-01-01 RX ADMIN — FAMOTIDINE 20 MG: 20 TABLET ORAL at 17:54

## 2017-01-01 RX ADMIN — MORPHINE SULFATE 2 MG: 2 INJECTION, SOLUTION INTRAMUSCULAR; INTRAVENOUS at 08:34

## 2017-01-01 RX ADMIN — MORPHINE SULFATE 2 MG: 2 INJECTION, SOLUTION INTRAMUSCULAR; INTRAVENOUS at 16:45

## 2017-01-01 RX ADMIN — DILTIAZEM HYDROCHLORIDE 120 MG: 120 CAPSULE, COATED, EXTENDED RELEASE ORAL at 08:18

## 2017-01-01 RX ADMIN — HALOPERIDOL LACTATE 2 MG: 5 INJECTION, SOLUTION INTRAMUSCULAR at 20:37

## 2017-01-01 RX ADMIN — MORPHINE SULFATE 2 MG: 2 INJECTION, SOLUTION INTRAMUSCULAR; INTRAVENOUS at 03:47

## 2017-01-01 RX ADMIN — INSULIN LISPRO 2 UNITS: 100 INJECTION, SOLUTION INTRAVENOUS; SUBCUTANEOUS at 21:12

## 2017-01-01 RX ADMIN — MORPHINE SULFATE 2 MG: 2 INJECTION, SOLUTION INTRAMUSCULAR; INTRAVENOUS at 08:09

## 2017-01-01 RX ADMIN — FAMOTIDINE 20 MG: 20 TABLET ORAL at 08:37

## 2017-01-01 RX ADMIN — MORPHINE SULFATE 2 MG: 2 INJECTION, SOLUTION INTRAMUSCULAR; INTRAVENOUS at 08:30

## 2017-01-01 RX ADMIN — MORPHINE SULFATE 4 MG: 4 INJECTION, SOLUTION INTRAMUSCULAR; INTRAVENOUS at 15:30

## 2017-01-01 RX ADMIN — QUETIAPINE FUMARATE 25 MG: 25 TABLET, FILM COATED ORAL at 04:20

## 2017-01-01 RX ADMIN — CYANOCOBALAMIN TAB 1000 MCG 1000 MCG: 1000 TAB at 09:09

## 2017-01-01 RX ADMIN — HALOPERIDOL LACTATE 2 MG: 5 INJECTION, SOLUTION INTRAMUSCULAR at 05:49

## 2017-01-01 RX ADMIN — MORPHINE SULFATE 2 MG: 2 INJECTION, SOLUTION INTRAMUSCULAR; INTRAVENOUS at 06:51

## 2017-01-01 RX ADMIN — HALOPERIDOL LACTATE 2 MG: 5 INJECTION, SOLUTION INTRAMUSCULAR at 06:32

## 2017-01-01 RX ADMIN — FAMOTIDINE 20 MG: 20 TABLET ORAL at 17:08

## 2017-01-01 RX ADMIN — HALOPERIDOL LACTATE 2 MG: 5 INJECTION, SOLUTION INTRAMUSCULAR at 17:52

## 2017-01-01 RX ADMIN — DILTIAZEM HYDROCHLORIDE 120 MG: 120 CAPSULE, COATED, EXTENDED RELEASE ORAL at 08:27

## 2017-01-01 RX ADMIN — HALOPERIDOL LACTATE 2 MG: 5 INJECTION, SOLUTION INTRAMUSCULAR at 08:09

## 2017-01-01 RX ADMIN — LORAZEPAM 1 MG: 1 TABLET ORAL at 17:15

## 2017-01-01 RX ADMIN — DIGOXIN 0.12 MG: 125 TABLET ORAL at 08:28

## 2017-01-01 RX ADMIN — CYANOCOBALAMIN TAB 1000 MCG 1000 MCG: 1000 TAB at 08:27

## 2017-01-01 RX ADMIN — DIGOXIN 0.12 MG: 125 TABLET ORAL at 08:37

## 2017-01-01 RX ADMIN — Medication 10 ML: at 01:47

## 2017-01-01 RX ADMIN — Medication 5 ML: at 13:43

## 2017-01-01 RX ADMIN — HALOPERIDOL LACTATE 2 MG: 5 INJECTION, SOLUTION INTRAMUSCULAR at 14:29

## 2017-01-01 RX ADMIN — HALOPERIDOL LACTATE 2 MG: 5 INJECTION, SOLUTION INTRAMUSCULAR at 16:14

## 2017-01-01 RX ADMIN — MORPHINE SULFATE 2 MG: 2 INJECTION, SOLUTION INTRAMUSCULAR; INTRAVENOUS at 18:15

## 2017-01-01 RX ADMIN — REGULAR STRENGTH 325 MG: 325 TABLET ORAL at 08:18

## 2017-01-01 RX ADMIN — HALOPERIDOL LACTATE 2 MG: 5 INJECTION, SOLUTION INTRAMUSCULAR at 06:41

## 2017-01-01 RX ADMIN — HALOPERIDOL LACTATE 2 MG: 5 INJECTION, SOLUTION INTRAMUSCULAR at 00:17

## 2017-01-01 RX ADMIN — POLYETHYLENE GLYCOL 3350 17 G: 17 POWDER, FOR SOLUTION ORAL at 09:00

## 2017-01-01 RX ADMIN — LORAZEPAM 1 MG: 2 INJECTION INTRAMUSCULAR; INTRAVENOUS at 12:07

## 2017-01-01 RX ADMIN — REGULAR STRENGTH 325 MG: 325 TABLET ORAL at 08:37

## 2017-01-01 RX ADMIN — MORPHINE SULFATE 2 MG: 2 INJECTION, SOLUTION INTRAMUSCULAR; INTRAVENOUS at 20:38

## 2017-01-01 RX ADMIN — SODIUM CHLORIDE 75 ML/HR: 900 INJECTION, SOLUTION INTRAVENOUS at 17:19

## 2017-01-01 RX ADMIN — MORPHINE SULFATE 2 MG: 2 INJECTION, SOLUTION INTRAMUSCULAR; INTRAVENOUS at 04:00

## 2017-01-01 RX ADMIN — HALOPERIDOL LACTATE 2 MG: 5 INJECTION, SOLUTION INTRAMUSCULAR at 00:18

## 2017-01-01 RX ADMIN — HALOPERIDOL LACTATE 2 MG: 5 INJECTION, SOLUTION INTRAMUSCULAR at 05:24

## 2017-01-01 RX ADMIN — POLYETHYLENE GLYCOL 3350 17 G: 17 POWDER, FOR SOLUTION ORAL at 08:17

## 2017-01-01 RX ADMIN — HALOPERIDOL LACTATE 2 MG: 5 INJECTION, SOLUTION INTRAMUSCULAR at 05:30

## 2017-01-01 RX ADMIN — MORPHINE SULFATE 2 MG: 2 INJECTION, SOLUTION INTRAMUSCULAR; INTRAVENOUS at 14:29

## 2017-01-01 RX ADMIN — HALOPERIDOL LACTATE 2 MG: 5 INJECTION, SOLUTION INTRAMUSCULAR at 04:24

## 2017-01-01 RX ADMIN — HALOPERIDOL LACTATE 2 MG: 5 INJECTION, SOLUTION INTRAMUSCULAR at 12:21

## 2017-01-01 RX ADMIN — INSULIN LISPRO 4 UNITS: 100 INJECTION, SOLUTION INTRAVENOUS; SUBCUTANEOUS at 16:41

## 2017-01-01 RX ADMIN — FAMOTIDINE 20 MG: 20 TABLET ORAL at 08:27

## 2017-01-01 RX ADMIN — MORPHINE SULFATE 2 MG: 2 INJECTION, SOLUTION INTRAMUSCULAR; INTRAVENOUS at 19:11

## 2017-01-01 RX ADMIN — HALOPERIDOL LACTATE 2 MG: 5 INJECTION, SOLUTION INTRAMUSCULAR at 03:36

## 2017-01-01 RX ADMIN — POLYETHYLENE GLYCOL 3350 17 G: 17 POWDER, FOR SOLUTION ORAL at 08:28

## 2017-01-01 RX ADMIN — HALOPERIDOL LACTATE 2 MG: 5 INJECTION, SOLUTION INTRAMUSCULAR at 17:48

## 2017-01-01 RX ADMIN — DILTIAZEM HYDROCHLORIDE 120 MG: 120 CAPSULE, COATED, EXTENDED RELEASE ORAL at 09:07

## 2017-01-01 RX ADMIN — HALOPERIDOL LACTATE 2 MG: 5 INJECTION, SOLUTION INTRAMUSCULAR at 06:04

## 2017-01-01 RX ADMIN — INSULIN LISPRO 10 UNITS: 100 INJECTION, SOLUTION INTRAVENOUS; SUBCUTANEOUS at 11:30

## 2017-01-01 RX ADMIN — MORPHINE SULFATE 2 MG: 2 INJECTION, SOLUTION INTRAMUSCULAR; INTRAVENOUS at 10:17

## 2017-01-01 RX ADMIN — MORPHINE SULFATE 2 MG: 2 INJECTION, SOLUTION INTRAMUSCULAR; INTRAVENOUS at 06:31

## 2017-01-01 RX ADMIN — FERROUS GLUCONATE 1 TABLET: 324 TABLET ORAL at 08:18

## 2017-01-01 RX ADMIN — HALOPERIDOL LACTATE 2 MG: 5 INJECTION, SOLUTION INTRAMUSCULAR at 14:01

## 2017-01-01 RX ADMIN — HALOPERIDOL LACTATE 2 MG: 5 INJECTION, SOLUTION INTRAMUSCULAR at 18:26

## 2017-01-01 RX ADMIN — FERROUS GLUCONATE 1 TABLET: 324 TABLET ORAL at 08:27

## 2017-01-01 RX ADMIN — HALOPERIDOL LACTATE 2 MG: 5 INJECTION, SOLUTION INTRAMUSCULAR at 08:31

## 2017-01-01 RX ADMIN — LORAZEPAM 1 MG: 1 TABLET ORAL at 20:01

## 2017-01-01 RX ADMIN — POTASSIUM CHLORIDE 20 MEQ: 200 INJECTION, SOLUTION INTRAVENOUS at 17:08

## 2017-01-01 RX ADMIN — MORPHINE SULFATE 2 MG: 2 INJECTION, SOLUTION INTRAMUSCULAR; INTRAVENOUS at 00:18

## 2017-01-01 RX ADMIN — HALOPERIDOL LACTATE 2 MG: 5 INJECTION, SOLUTION INTRAMUSCULAR at 04:00

## 2017-01-01 RX ADMIN — POLYETHYLENE GLYCOL 3350 17 G: 17 POWDER, FOR SOLUTION ORAL at 09:15

## 2017-01-01 RX ADMIN — HALOPERIDOL LACTATE 2 MG: 5 INJECTION, SOLUTION INTRAMUSCULAR at 22:11

## 2017-01-01 RX ADMIN — DEXTROSE MONOHYDRATE AND SODIUM CHLORIDE 75 ML/HR: 5; .45 INJECTION, SOLUTION INTRAVENOUS at 15:41

## 2017-01-01 RX ADMIN — LORAZEPAM 1 MG: 1 TABLET ORAL at 15:14

## 2017-01-01 RX ADMIN — DIGOXIN 0.12 MG: 125 TABLET ORAL at 08:18

## 2017-01-01 RX ADMIN — INSULIN LISPRO 6 UNITS: 100 INJECTION, SOLUTION INTRAVENOUS; SUBCUTANEOUS at 17:22

## 2017-01-01 RX ADMIN — HYDROMORPHONE HYDROCHLORIDE 1 MG: 1 INJECTION, SOLUTION INTRAMUSCULAR; INTRAVENOUS; SUBCUTANEOUS at 06:02

## 2017-01-01 RX ADMIN — Medication 5 ML: at 21:15

## 2017-01-01 RX ADMIN — GLYCOPYRROLATE 0.2 MG: 0.2 INJECTION INTRAMUSCULAR; INTRAVENOUS at 15:06

## 2017-01-01 RX ADMIN — MORPHINE SULFATE 2 MG: 2 INJECTION, SOLUTION INTRAMUSCULAR; INTRAVENOUS at 06:36

## 2017-01-01 RX ADMIN — CEFTRIAXONE 1 G: 1 INJECTION, POWDER, FOR SOLUTION INTRAMUSCULAR; INTRAVENOUS at 11:00

## 2017-01-01 RX ADMIN — INSULIN LISPRO 2 UNITS: 100 INJECTION, SOLUTION INTRAVENOUS; SUBCUTANEOUS at 12:03

## 2017-01-01 RX ADMIN — SODIUM CHLORIDE 75 ML/HR: 900 INJECTION, SOLUTION INTRAVENOUS at 22:25

## 2017-01-01 RX ADMIN — HALOPERIDOL LACTATE 2 MG: 5 INJECTION, SOLUTION INTRAMUSCULAR at 15:41

## 2017-01-01 RX ADMIN — MORPHINE SULFATE 2 MG: 2 INJECTION, SOLUTION INTRAMUSCULAR; INTRAVENOUS at 10:03

## 2017-01-01 RX ADMIN — HALOPERIDOL LACTATE 2 MG: 5 INJECTION, SOLUTION INTRAMUSCULAR at 09:50

## 2017-01-01 RX ADMIN — HALOPERIDOL LACTATE 2 MG: 5 INJECTION, SOLUTION INTRAMUSCULAR at 22:26

## 2017-01-01 RX ADMIN — HALOPERIDOL LACTATE 2 MG: 5 INJECTION, SOLUTION INTRAMUSCULAR at 03:52

## 2017-01-01 RX ADMIN — REGULAR STRENGTH 325 MG: 325 TABLET ORAL at 08:27

## 2017-01-01 RX ADMIN — GLYCOPYRROLATE 0.2 MG: 0.2 INJECTION INTRAMUSCULAR; INTRAVENOUS at 00:20

## 2017-01-01 RX ADMIN — HALOPERIDOL LACTATE 2 MG: 5 INJECTION, SOLUTION INTRAMUSCULAR at 10:22

## 2017-01-01 RX ADMIN — Medication 10 ML: at 05:35

## 2017-01-01 RX ADMIN — MORPHINE SULFATE 2 MG: 2 INJECTION, SOLUTION INTRAMUSCULAR; INTRAVENOUS at 12:22

## 2017-01-01 RX ADMIN — MORPHINE SULFATE 2 MG: 2 INJECTION, SOLUTION INTRAMUSCULAR; INTRAVENOUS at 10:49

## 2017-01-01 RX ADMIN — INSULIN LISPRO 2 UNITS: 100 INJECTION, SOLUTION INTRAVENOUS; SUBCUTANEOUS at 08:28

## 2017-01-01 RX ADMIN — MORPHINE SULFATE 2 MG: 2 INJECTION, SOLUTION INTRAMUSCULAR; INTRAVENOUS at 01:03

## 2017-01-01 RX ADMIN — MORPHINE SULFATE 2 MG: 2 INJECTION, SOLUTION INTRAMUSCULAR; INTRAVENOUS at 16:14

## 2017-01-01 RX ADMIN — DIGOXIN 0.12 MG: 125 TABLET ORAL at 09:08

## 2017-05-23 PROBLEM — G93.41 METABOLIC ENCEPHALOPATHY: Status: ACTIVE | Noted: 2017-01-01

## 2017-05-23 PROBLEM — H91.90 HEARING LOSS: Chronic | Status: ACTIVE | Noted: 2017-01-01

## 2017-05-23 PROBLEM — N17.9 AKI (ACUTE KIDNEY INJURY) (HCC): Status: ACTIVE | Noted: 2017-01-01

## 2017-05-23 PROBLEM — D72.829 LEUKOCYTOSIS: Status: ACTIVE | Noted: 2017-01-01

## 2017-05-23 PROBLEM — I48.91 A-FIB (HCC): Chronic | Status: ACTIVE | Noted: 2017-01-01

## 2017-05-23 PROBLEM — S32.000A LUMBAR COMPRESSION FRACTURE (HCC): Status: ACTIVE | Noted: 2017-01-01

## 2017-05-23 PROBLEM — R73.9 HYPERGLYCEMIA: Status: ACTIVE | Noted: 2017-01-01

## 2017-05-23 PROBLEM — D64.9 ANEMIA: Chronic | Status: ACTIVE | Noted: 2017-01-01

## 2017-05-23 PROBLEM — R41.3 MEMORY LOSS: Chronic | Status: ACTIVE | Noted: 2017-01-01

## 2017-05-23 NOTE — H&P
Subjective:     Patient: John Finn MRN: [id number]  SSN: [id number]    YOB: 1917  Age: 80 y.o. Sex: male        HPI: Mr. Candice Vargas is a 81 yo WM with PMH of afib, memory loss living at memory care at [address] [place] [address] s/p 2 falls in 24 hours and increasing confusion. Daughter Kathleen Velasquezikawa at bedside for history due to patients mentation/hearing loss. Ambulates with walker, has some chronic memory loss with recently worsening confusion. Labs show creatinine of 1.76 with recently worsening anorexia. Lspine films show L1,2,5 compression fractures and he has back pain complaints. 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 ROS limited per mentation and hearing     Past Medical History:   Diagnosis Date    Arthritis     Hypertension     Other unknown and unspecified cause of morbidity or mortality     a-fib      Past Surgical History:   Procedure Laterality Date    HX UROLOGICAL      prostate        (Not in a hospital admission)  Current Facility-Administered Medications   Medication Dose Route Frequency    0.9% sodium chloride infusion  125 mL/hr IntraVENous CONTINUOUS    traMADol (ULTRAM) tablet 50 mg  50 mg Oral Q6H PRN    tuberculin injection 5 Units  5 Units IntraDERMal ONCE    insulin lispro (HUMALOG) injection   SubCUTAneous AC&HS     Current Outpatient Prescriptions   Medication Sig    bisacodyl (DULCOLAX) 5 mg EC tablet Take 5 mg by mouth daily as needed for Constipation.  calcipotriene (DOVONOX) 0.005 % ointment Apply  to affected area two (2) times a day.  ferrous gluconate 324 mg (38 mg iron) tablet Take 324 mg by mouth Daily (before breakfast).  ranitidine (ZANTAC) 150 mg tablet Take 150 mg by mouth two (2) times a day.  cyanocobalamin 1,000 mcg tablet Take 1,000 mcg by mouth daily.  cholecalciferol (VITAMIN D3) 1,000 unit cap Take 1,000 Units by mouth daily.  sennosides 8.6 mg cap Take 1 Cap by mouth.     acetaminophen (TYLENOL) 325 mg tablet Take 500 mg by mouth every four (4) hours as needed for Pain.  aspirin delayed-release 325 mg tablet TAKE 1 TABLET BY MOUTH ONCE DAILY.  DIGOX 125 mcg tablet TAKE 1 TABLET BY MOUTH ONCE DAILY.  DILT- mg XR capsule TAKE 1 CAPSULE BY MOUTH ONCE DAILY.  furosemide (LASIX) 40 mg tablet TAKE 1 TABLET BY MOUTH ONCE DAILY.  polyethylene glycol (MIRALAX) 17 gram packet Take 17 g by mouth daily.  POTASSIUM CHLORIDE SR 10 MEQ TAB 10 mEq daily.  loperamide (IMODIUM) 2 mg capsule Take 2 mg by mouth. No Known Allergies   Social History   Substance Use Topics    Smoking status: Never Smoker    Smokeless tobacco: Not on file    Alcohol use No      History reviewed. No pertinent family history. Review of Systems  Complete review of systems was not obtained per HPI    I have reviewed all the pertinent medical and surgical history, social history, allergies, family history,  as well as pertinent labs and studies . Objective:     Patient Vitals for the past 24 hrs:   BP Temp Pulse Resp SpO2 Weight   05/23/17 1344 158/83 - 98 12 97 % -   05/23/17 1321 154/79 - 97 16 98 % -   05/23/17 1205 160/77 - 98 12 98 % -   05/23/17 1113 168/69 98.3 °F (36.8 °C) 98 16 96 % 59 kg (130 lb)             Exam:  General: elderly, thin, alert  Eyes: PERRLA  HEENT: oral cavity clear,very dry mucosa, nasal septum midline  Neck: supple, symmetrical, trachea midline, no adenopathy,no carotid bruit and no JVD  Lungs: clear to auscultation bilaterally, good effort anterior exam   Heart: regular rate and rhythm, S1, S2 normal, no murmur, click, rub or gallop, no edema   Abdomen: soft, non-tender.  Bowel sounds normal. No masses,  no organomegaly  Extremities: extremities normal, atraumatic, no cyanosis or edema  Skin: diffuse skin lesions of scalp/ nasal deformity from prior MOHS procedure, diffuse extremity bruising  Neurologic: Alert    Musculoskeletal: no gross deficits   Psychiatric: difficult to evaluate    ECG:     Data Review (Labs):   Recent Results (from the past 24 hour(s))   CBC W/O DIFF    Collection Time: 05/23/17 11:32 AM   Result Value Ref Range    WBC 13.2 (H) 4.3 - 11.1 K/uL    RBC 3.19 (L) 4.23 - 5.67 M/uL    HGB 10.7 (L) 13.6 - 17.2 g/dL    HCT 33.4 (L) 41.1 - 50.3 %    .7 (H) 79.6 - 97.8 FL    MCH 33.5 (H) 26.1 - 32.9 PG    MCHC 32.0 31.4 - 35.0 g/dL    RDW 13.3 11.9 - 14.6 %    PLATELET 772 420 - 635 K/uL    MPV 10.5 (L) 10.8 - 75.3 FL   METABOLIC PANEL, BASIC    Collection Time: 05/23/17 11:32 AM   Result Value Ref Range    Sodium 139 136 - 145 mmol/L    Potassium 4.1 3.5 - 5.1 mmol/L    Chloride 99 98 - 107 mmol/L    CO2 30 21 - 32 mmol/L    Anion gap 10 7 - 16 mmol/L    Glucose 343 (H) 65 - 100 mg/dL    BUN 28 (H) 8 - 23 MG/DL    Creatinine 1.76 (H) 0.8 - 1.5 MG/DL    GFR est AA 46 (L) >60 ml/min/1.73m2    GFR est non-AA 38 (L) >60 ml/min/1.73m2    Calcium 9.7 8.3 - 10.4 MG/DL   POC LACTIC ACID    Collection Time: 05/23/17 12:48 PM   Result Value Ref Range    Lactic Acid (POC) 1.4 0.5 - 1.9 mmol/L       Assessment / Plan:   Principal Problem:    JODI (acute kidney injury) (RUST 75.) (5/23/2017)    Active Problems:    Memory loss (5/23/2017)      Lumbar compression fracture (HCC) (5/23/2017)      Leukocytosis (5/23/2017)      A-fib (RUST 75.) (3/36/9869)      Metabolic encephalopathy (6/29/3061)      Hearing loss (5/23/2017)      Anemia (5/23/2017)      Hyperglycemia (5/23/2017)        -admit to medical bed   -CXR, UA, follow CBC with possibly reactive leukocytosis   -hydrate, follow BMP  -follow mentation   -PT/OT eval, ultram/tylenol for back pain, re-eval for need for kyphoplasty pending course   -SW for dispo, PPD placed   -check digoxin level, LFTS, A1C     DVT Prophylaxis:SCD  FEN:oral,IVF  Code Status: DNR  [address] [facility] [place] addressed: daughter Blanca People [phone], [phone]  EST LOS 3 days  Sony Stevens MD    Signed By: Sony Stevens MD     May 23, 2017

## 2017-05-23 NOTE — PROGRESS NOTES
Call received from daughter stating that she would like patient to go to SNF if he meets 3 night inpatient stay. Choices are (in order) [facility]; [facility]; hospitals; [address] [place] [address] and Rehab. Would like to be considered for short to long term bed. Notified we would follow up in am to see if any short to long term available.

## 2017-05-23 NOTE — PROGRESS NOTES
Problem: Nutrition Deficit  Goal: *Optimize nutritional status  Nutrition  Reason for assessment: Referral received from nursing admission Malnutrition Screening Tool for unsure of weight loss and eating poorly r/t decreased appetite. Assessment:   Diet order(s): Consistent CHO, GI soft  Food/Nutrition Patient History:  Pt presented from [address] [place] [address]; memory care r/t 2 falls past 24 hours and increased confusion. Pt with chronic memory loss and recent worsening anorexia. Family not available; pt confused; trying to get out of the bed. Pt edentulous; dentures on bedside table. Anthropometrics:Height: 5' 1\" (154.9 cm),  Weight: 59 kg (130 lb), Weight source not stated, Body mass index is 24.56 kg/(m^2). BMI class of normal range. Macronutrient needs:  EER:  8931-8974 kcal /day (25-30 kcal/kg BW)  EPR:  59-71 grams protein/day (1-1.2 grams/kg BW)-used BW r/t unsure height prior to compression fractures  Intake/Comparative Standards:  Pt recently admitted; no meals yet. Nutrition Diagnosis:   Inadequate oral intake r/t decreased ability to consume sufficient energy as evidenced by recent anorexia per H&P, unsure of weight loss, poor skin turgor. Intervention:  1. Meals and snacks: Continue current diet; add mechanical soft texture; pt may need assistance with feeding r/t altered mental status. Follow up for adequacy of po intake. 2.  Nutrition Supplement Therapy: Initiated Glucerna Shake supplement all meals  3. Collaboration of Nutrition Services:  Trevor Kapoor RN  4.   Ordered weight  Sherri Michaud, [address] [place] [address], LD, MPH  [phone]

## 2017-05-23 NOTE — PROGRESS NOTES
TRANSFER - IN REPORT:    Verbal report received from [address] [place] [address] on Stacy Pfeiffer  being received from ED for routine progression of care      Report consisted of patients Situation, Background, Assessment and   Recommendations(SBAR). Information from the following report(s) SBAR, Kardex, ED Summary, Intake/Output, MAR, Accordion and Recent Results was reviewed with the receiving nurse. Opportunity for questions and clarification was provided. Assessment completed upon patients arrival to unit and care assumed.

## 2017-05-23 NOTE — PROGRESS NOTES
Inserted pino catheter with staff assist. Patient tolerated well. 500 cc clear yellow urine in pino bag.

## 2017-05-23 NOTE — ED NOTES
TRANSFER - OUT REPORT:    Verbal report given to MARKO Oliva(name) on Glorine Lung  being transferred to [facility](unit) for routine progression of care       Report consisted of patients Situation, Background, Assessment and   Recommendations(SBAR). Information from the following report(s) SBAR, ED Summary, Procedure Summary, MAR and Recent Results was reviewed with the receiving nurse. Lines:   Peripheral IV 05/23/17 Left Antecubital (Active)   Site Assessment Clean, dry, & intact 5/23/2017 11:34 AM   Phlebitis Assessment 0 5/23/2017 11:34 AM   Infiltration Assessment 0 5/23/2017 11:34 AM   Dressing Status Clean, dry, & intact 5/23/2017 11:34 AM   Hub Color/Line Status Pink 5/23/2017 11:34 AM        Opportunity for questions and clarification was provided.       Patient transported with:   [employer]

## 2017-05-23 NOTE — ED TRIAGE NOTES
Pt brought to Ed via ems. Pt is from TE[facility] at [place]. Per EMS pt fell last night and is reporting back pain. Pt's daughter did not want patient transported in middle of night so had them call EMS this morning.     Naz Fernandes RN

## 2017-05-23 NOTE — ED PROVIDER NOTES
HPI     CDNotes Templates                            Emergency Department     Chief Complaint:  Fall with back pain  HPI:  8-year-old male resident of the [facility] presents after a fall. Patient was using his walker. She fell and landed on his buttocks and then hit the back of his head. No loss of consciousness. Was assisted back to bed. Later was found in the bathroom having fallen again holding onto the rail. Patient with decreased activity today. Not conversant. At breakfast he just sat and stared at the food did not eat. He mumbles answers to some questions but is not very responsive for us  Symptoms started This morning  Severity, associated unable to be obtained secondary to patient's mental status  Historian:   Family and EMS  Review of Systems:Unable to be obtained secondary to patient's mental status changes  Include pertinent positives and negatives. Past Medical History:  Past Medical History:   Diagnosis Date    Arthritis     Hypertension     Other unknown and unspecified cause of morbidity or mortality     a-fib     Past Surgical History:   Procedure Laterality Date    HX UROLOGICAL      prostate     Social History   Substance Use Topics    Smoking status: Never Smoker    Smokeless tobacco: None    Alcohol use No     History reviewed. No pertinent family history. Previous Medications    ACETAMINOPHEN (TYLENOL) 325 MG TABLET    Take 500 mg by mouth every four (4) hours as needed for Pain. ASPIRIN DELAYED-RELEASE 325 MG TABLET    TAKE 1 TABLET BY MOUTH ONCE DAILY. BISACODYL (DULCOLAX) 5 MG EC TABLET    Take 5 mg by mouth daily as needed for Constipation. CALCIPOTRIENE (DOVONOX) 0.005 % OINTMENT    Apply  to affected area two (2) times a day. CHOLECALCIFEROL (VITAMIN D3) 1,000 UNIT CAP    Take 1,000 Units by mouth daily. CYANOCOBALAMIN 1,000 MCG TABLET    Take 1,000 mcg by mouth daily. DIGOX 125 MCG TABLET    TAKE 1 TABLET BY MOUTH ONCE DAILY.     DILT-XR 120 MG XR CAPSULE    TAKE 1 CAPSULE BY MOUTH ONCE DAILY. FERROUS GLUCONATE 324 MG (38 MG IRON) TABLET    Take 324 mg by mouth Daily (before breakfast). FUROSEMIDE (LASIX) 40 MG TABLET    TAKE 1 TABLET BY MOUTH ONCE DAILY. LOPERAMIDE (IMODIUM) 2 MG CAPSULE    Take 2 mg by mouth. POLYETHYLENE GLYCOL (MIRALAX) 17 GRAM PACKET    Take 17 g by mouth daily. POTASSIUM CHLORIDE SR 10 MEQ TAB    10 mEq daily. RANITIDINE (ZANTAC) 150 MG TABLET    Take 150 mg by mouth two (2) times a day. SENNOSIDES 8.6 MG CAP    Take 1 Cap by mouth. Allergies as of 05/23/2017    (No Known Allergies)     Physical Exam:    Vital signs:   Visit Vitals    /69    Pulse 98    Temp 98.3 °F (36.8 °C)    Resp 16    Wt 59 kg (130 lb)    SpO2 96%    BMI 25.39 kg/m2     Vital signs were reviewed. Pulse oximetry interpretation: 96%, normal    General Appear: Elderly, ill-appearing but nontoxic male. Head:   atraumatic  Ears/Nose/Throat: Mucous membranes are dry. No exudates are noted  Eyes:   PERRL, EOMI, anicteric  Neck:   supple, FROM,   Cardiovascular: regular rate and rhythm, no murmur  Respiratory:  clear to auscultation with no wheezes, rales, ronchi  Back:    FROM, no Bony tenderness- Pain when sitting up  Abdomen:  soft, non-tender, no guarding/rebound,   Musculoskeletal: Able to range bilateral upper shoulders without difficulty. He has pain with movement of the right lower extremity.   Skin:   Dry, poor turgor  Neurologic:  Unable to be assessed secondary to patient's mental status changes     _______________________________________________________________________    LABS/RADIOLOGY/EKG:    Labs:     Results for orders placed or performed during the hospital encounter of 05/23/17   CBC W/O DIFF   Result Value Ref Range    WBC 13.2 (H) 4.3 - 11.1 K/uL    RBC 3.19 (L) 4.23 - 5.67 M/uL    HGB 10.7 (L) 13.6 - 17.2 g/dL    HCT 33.4 (L) 41.1 - 50.3 %    .7 (H) 79.6 - 97.8 FL    MCH 33.5 (H) 26.1 - 32.9 PG MCHC 32.0 31.4 - 35.0 g/dL    RDW 13.3 11.9 - 14.6 %    PLATELET 236 254 - 865 K/uL    MPV 10.5 (L) 10.8 - 77.6 FL   METABOLIC PANEL, BASIC   Result Value Ref Range    Sodium 139 136 - 145 mmol/L    Potassium 4.1 3.5 - 5.1 mmol/L    Chloride 99 98 - 107 mmol/L    CO2 30 21 - 32 mmol/L    Anion gap 10 7 - 16 mmol/L    Glucose 343 (H) 65 - 100 mg/dL    BUN 28 (H) 8 - 23 MG/DL    Creatinine 1.76 (H) 0.8 - 1.5 MG/DL    GFR est AA 46 (L) >60 ml/min/1.73m2    GFR est non-AA 38 (L) >60 ml/min/1.73m2    Calcium 9.7 8.3 - 10.4 MG/DL   POC LACTIC ACID   Result Value Ref Range    Lactic Acid (POC) 1.4 0.5 - 1.9 mmol/L   Labs were reviewed and interpreted by me. Radiology studies performed:    CT HEAD WO CONT   Final Result   Impression:      Chronic changes. No acute abnormality            XR SPINE LUMB 2 OR 3 V   Final Result   IMPRESSION: Moderate lumbar compression      XR PELV AP ONLY   Final Result   IMPRESSION: No acute bony abnormality        ________________________________________________________________________  Progress:    8-year-old male status post fall. Spoke at length with his daughter who is a nurse in the PACU [place]. Patient has had a decline in his mental status over the last day or 2. Normally is more conversant and talkative. Enjoys breakfast.  He did not have any breakfast this morning. He will mumble answers to some questions but is not at his baseline. Nursing notes were reviewed: Yes  Old medical records: obtained and reviewed:  Yes  Discussed patient with another provider: Dr. Phil Matias  _______________________________________________________________________  Assessment/Plan:    Differential includes trauma, stroke, electrolyte abnormality, infection    CT of the head shows bilateral subdural hygromas. X-rays show multiple lumbar compression fractures. Labs show an elevated white blood cell count. Creatinine is elevated at 1.76.   Review of old records from his primary care physician shows creatinine of 1.83 and 2014. He has some mental status changes. Increased confusion with decreased responsiveness. We'll hydrate him. Dr. Yolanda Painting to admit him for ongoing management of his mental status changes. His daughter does not want to have any procedures done. He may benefit from hospice and a referral to a skilled nursing facility.  _______________________________________________________________________  Condition:  Guarded  Disposition:  Admit  Diagnosis:    1. Somnolence    2. Fall, initial encounter    3. Acute midline low back pain without sciatica    4.  Hyperglycemia            Natalya Ross M.D.      Mervat Ware; version 2.0; revised April, 2016      Review of Systems    Vitals:    05/23/17 1113   BP: 168/69   Pulse: 98   Resp: 16   Temp: 98.3 °F (36.8 °C)   SpO2: 96%   Weight: 59 kg (130 lb)            Physical Exam     [facility]  ED Course       Procedures

## 2017-05-23 NOTE — PROGRESS NOTES
Patient yelling out wants out of the bed. Reoriented him pace and situation. Notified MD new order for Haldol received.

## 2017-05-23 NOTE — PROGRESS NOTES
Skin assessment complete with Tim Lock RN. Redness and abrasion to the right forehead,ecchymosis scattered on bilateral forearms. Abrasion on left sheen and bilateral heels redden elevated on pillows.

## 2017-05-23 NOTE — PROGRESS NOTES
Visited with patient at request of Dr Adele Fowler. Daughter [facility] ([phone]), who is an RN SFDT in PACU, is at bedside. Patient is minimally communicable and very hard of hearing. Per daughter, patient has been at The [address] [place] [address] since 2014. Started out in independent apartments but has since been moved to memory care. States he uses a walker but is otherwise independent in his ADLs and is continent. States over the last 2 mths she has noticed patient is more confused, thinking one minute it is day and the next it is night, and has also been sleeping during the day a lot more than normal.  States got up confused in the middle of the night last night and fell backward on his buttocks and hit his head. They did not bring him to the hospital but then when he was in the shower he had another incident where they found him hanging onto the shower rail while in the shower. States she is an only child but her 's sister is like a sister to her and helps her quite a bit with her dad. Discussed options - daughter states she has been considering hospice. Daughter given a list of SNF facilities to review and notified we would wait until dispo and patient progress before making a decision.

## 2017-05-24 NOTE — PROGRESS NOTES
5/25/17- Pt has bed offer to [address] [place] [address] which dtr and Margarita Jasso RN had discussed. Marilu Noriega with [facility] admissions, for [place], made aware today that it is anticipated pt will be ready Sat or Sunday. SW following for dc. Diane Arredondo reports dtr called her and requested [address] because they will not qualify for Medicaid therefore if having to pay out of pocket they prefer their first choice of [address]. Dtr informed Margarita Jasso she called and spoke with Marge Oneal at [facility] who said they had a bed. Referral sent through CC to [address] to see if they do accept pt. Hillary Francis      Call to [address] and left message with Jamia. Call to Marilu Noriega at [facility] who states there are no short to long term beds at [facility] but she believes there are some available at [facility] and Rehab. States she will check and let me know. Spoke to Melisa Felty at [facility] who states there are no short to long term beds there.

## 2017-05-24 NOTE — PROGRESS NOTES
Hospitalist Progress Note    2017  Admit Date: 2017 11:15 AM   NAME: Donald Villalba   :  3/12/1917   MRN:  [id number]   Attending: Chen Chatman MD  PCP:  Lars Mckay MD  Treatment Team: Attending Provider: Chen Chatman MD    DNR   SUBJECTIVE:   Donadl Villalba is a 80 y.o. male admitted with increasing confusion. CT with chronic changes. No family present. Xray L-Spine  with compression fx's L1,2,5 - age indeterminiate. Pt resting comfortably in NAD.       Past Medical History:   Diagnosis Date    Arthritis     Hypertension     Other unknown and unspecified cause of morbidity or mortality     a-fib     Recent Results (from the past 24 hour(s))   GLUCOSE, POC    Collection Time: 17  4:38 PM   Result Value Ref Range    Glucose (POC) 270 (H) 65 - 100 mg/dL   GLUCOSE, POC    Collection Time: 17  9:54 PM   Result Value Ref Range    Glucose (POC) 92 65 - 079 mg/dL   METABOLIC PANEL, BASIC    Collection Time: 17  6:31 AM   Result Value Ref Range    Sodium 144 136 - 145 mmol/L    Potassium 3.4 (L) 3.5 - 5.1 mmol/L    Chloride 106 98 - 107 mmol/L    CO2 25 21 - 32 mmol/L    Anion gap 13 7 - 16 mmol/L    Glucose 139 (H) 65 - 100 mg/dL    BUN 26 (H) 8 - 23 MG/DL    Creatinine 1.46 0.8 - 1.5 MG/DL    GFR est AA 57 (L) >60 ml/min/1.73m2    GFR est non-AA 47 (L) >60 ml/min/1.73m2    Calcium 8.6 8.3 - 10.4 MG/DL   CBC WITH AUTOMATED DIFF    Collection Time: 17  6:31 AM   Result Value Ref Range    WBC 10.6 4.3 - 11.1 K/uL    RBC 2.85 (L) 4.23 - 5.67 M/uL    HGB 9.6 (L) 13.6 - 17.2 g/dL    HCT 29.2 (L) 41.1 - 50.3 %    .5 (H) 79.6 - 97.8 FL    MCH 33.7 (H) 26.1 - 32.9 PG    MCHC 32.9 31.4 - 35.0 g/dL    RDW 13.9 11.9 - 14.6 %    PLATELET 860 545 - 629 K/uL    MPV 10.3 (L) 10.8 - 14.1 FL    DF AUTOMATED      NEUTROPHILS 76 43 - 78 %    LYMPHOCYTES 9 (L) 13 - 44 %    MONOCYTES 14 (H) 4.0 - 12.0 %    EOSINOPHILS 0 (L) 0.5 - 7.8 %    BASOPHILS 0 0.0 - 2.0 % IMMATURE GRANULOCYTES 0.7 0.0 - 5.0 %    ABS. NEUTROPHILS 8.0 1.7 - 8.2 K/UL    ABS. LYMPHOCYTES 1.0 0.5 - 4.6 K/UL    ABS. MONOCYTES 1.5 (H) 0.1 - 1.3 K/UL    ABS. EOSINOPHILS 0.0 0.0 - 0.8 K/UL    ABS. BASOPHILS 0.0 0.0 - 0.2 K/UL    ABS. IMM.  GRANS. 0.1 0.0 - 0.5 K/UL   GLUCOSE, POC    Collection Time: 05/24/17  7:14 AM   Result Value Ref Range    Glucose (POC) 146 (H) 65 - 100 mg/dL   GLUCOSE, POC    Collection Time: 05/24/17 10:30 AM   Result Value Ref Range    Glucose (POC) 207 (H) 65 - 100 mg/dL     No Known Allergies  Current Facility-Administered Medications   Medication Dose Route Frequency Provider Last Rate Last Dose    aspirin delayed-release tablet 325 mg  325 mg Oral DAILY Tony Harden MD   325 mg at 05/24/17 0911    bisacodyl (DULCOLAX) tablet 5 mg  5 mg Oral DAILY PRN Tony Harden MD        cholecalciferol (VITAMIN D3) tablet 1,000 Units  1,000 Units Oral DAILY Tony Harden MD   1,000 Units at 05/24/17 0911    cyanocobalamin tablet 1,000 mcg  1,000 mcg Oral DAILY Tony Harden MD   1,000 mcg at 05/24/17 8717    digoxin (LANOXIN) tablet 0.125 mg  0.125 mg Oral DAILY Margarette Vu MD   0.125 mg at 05/24/17 0908    dilTIAZem CD (CARDIZEM CD) capsule 120 mg  120 mg Oral DAILY Tony Harden MD   120 mg at 05/24/17 7761    ferrous gluconate 324 mg (38 mg iron) tablet 1 Tab  1 Tab Oral ACB Tony Harden MD        polyethylene glycol (MIRALAX) packet 17 g  17 g Oral DAILY Tony Harden MD   17 g at 05/24/17 0915    famotidine (PEPCID) tablet 20 mg  20 mg Oral BID Tony Harden MD   20 mg at 05/24/17 0906    sodium chloride (NS) flush 5-10 mL  5-10 mL IntraVENous Q8H Tony Harden MD   5 mL at 05/24/17 1343    sodium chloride (NS) flush 5-10 mL  5-10 mL IntraVENous PRN Tony Harden MD        acetaminophen (TYLENOL) tablet 500 mg  500 mg Oral Q6H PRN Tony Harden MD        ondansetron [facility] injection 4 mg  4 mg IntraVENous Q4H PRN Gabriel Jerome MD        traMADol [place]) tablet 50 mg  50 mg Oral Q6H PRN Gabriel Jerome MD   50 mg at 17 1501    insulin lispro (HUMALOG) injection   SubCUTAneous AC&HS Gabriel Jerome MD   4 Units at 17 1205    haloperidol lactate (HALDOL) injection 0.5 mg  0.5 mg IntraVENous Q8H PRN Gabriel Jerome MD        calcipotriene (DOVONOX) 0.005 % ointment   Topical BID Gabriel Jerome MD           Review of Systems negative with exception of pertinent positives noted above  PHYSICAL EXAM     Visit Vitals    /49 (BP 1 Location: Left arm, BP Patient Position: At rest;Supine)    Pulse 83    Temp 96.3 °F (35.7 °C)    Resp 18    Ht 5' 1\" (1.549 m)    Wt 59 kg (130 lb)    SpO2 95%    BMI 24.56 kg/m2      Temp (24hrs), Av.9 °F (36.6 °C), Min:96.3 °F (35.7 °C), Max:98.6 °F (37 °C)    Oxygen Therapy  O2 Sat (%): 95 % (17 1051)  Pulse via Oximetry: 90 beats per minute (17 1321)  O2 Device: Room air (17 0440)    Intake/Output Summary (Last 24 hours) at 17 1345  Last data filed at 17 0608   Gross per 24 hour   Intake             1188 ml   Output             1200 ml   Net              -12 ml      General: No acute distress    Lungs: CTAB anteriorly  Heart:  S1/S2, no M/G/R  Abdomen: Soft, NT/ND, positive bowel sounds  Extremities:  No clubbing, cyanosis, edema  Neurologic:  Alert      ASSESSMENT      Active Hospital Problems    Diagnosis Date Noted    JODI (acute [facility] injury) ([facility] [address] [facility] 2017    Memory loss 2017    Lumbar compression fracture (HCC) 2017    Leukocytosis 2017    A-fib ([facility]     Metabolic encephalopathy     Hearing loss 2017    Anemia 2017    Hyperglycemia 2017     Plan:  1. JODI: Cr stable, monitor, avoid nephrotoxic meds    2. Compression fx's L1,2,5 - age indeterminate, pt in no distress, ultram PRN, monitor    3. Hypokalemia: replace, BMP for am    4. AFIB: HR stable,, continue diltiazem/ digoxin, digoxin level 1.2    5.  Leukocytosis: resolved    DVT Prophylaxis: SCD's  Plan of Care Discussed with: Supervising MD Dr Walter Corcoran, PA-C

## 2017-05-24 NOTE — PROGRESS NOTES
Problem: Mobility Impaired (Adult and Pediatric)  Goal: *Acute Goals and Plan of Care (Insert Text)  STG:  (1.)Mr. Diana Etienne will move from supine to sit and sit to supine and roll side to side with MODERATE ASSIST within 5 day(s). (2.)Mr. Diana Etienne will transfer from bed to chair and chair to bed with MODERATE ASSIST using the least restrictive device within 5 day(s). (3.)Mr. Diana Etienne will increase B LE strength 1/2 grade within 5 day(s). Will assess ambulation as appropriate      ________________________________________________________________________________________________      PHYSICAL THERAPY: INITIAL ASSESSMENT, AM 5/24/2017   5 Day PT TRIAL  INPATIENT: Hospital Day: 2  [place] / Product Type: Medicare /      NAME/AGE/GENDER: Faith Alicea is a 80 y.o. male    PRIMARY DIAGNOSIS: JODI (acute kidney injury) ([facility] [address] JODI (acute kidney injury) ([facility] [address] JODI (acute kidney injury) ([facility] [address] [facility]        ICD-10: Treatment Diagnosis:       · Generalized Muscle Weakness (M62.81)  · Other lack of cordination (R27.8)  · Difficulty in walking, Not elsewhere classified (R26.2)  · Other abnormalities of gait and mobility (R26.89)  · History of falling (Z91.81)   Precaution/Allergies:  Review of patient's allergies indicates no known allergies. ASSESSMENT:      Mr. Diana Etienne presents with JODI after several falls at his [facility]. It is noted that he has L1,2,5 compression fractures that are to be assessed further. He is in pain in his LEs, head, arms and back with any movement at all. He is only able to tolerate sitting on edge of bed for about 2 minutes before pain makes him lay down. He is totally dependent for any functional mobility. This section established at most recent assessment   PROBLEM LIST (Impairments causing functional limitations):  1. Decreased Strength  2. Decreased Transfer Abilities  3. Decreased Ambulation Ability/Technique  4. Decreased Balance  5.  Increased Pain  6. Decreased Activity Tolerance  7. Increased Fatigue  8. Decreased Flexibility/Joint Mobility  9. Decreased Knowledge of Precautions  10. Decreased [place] with Home Exercise Program    INTERVENTIONS PLANNED: (Benefits and precautions of physical therapy have been discussed with the patient.)  1. Balance Exercise  2. Bed Mobility  3. Gait Training  4. Range of Motion (ROM)  5. Therapeutic Activites  6. Therapeutic Exercise/Strengthening  7. Transfer Training      TREATMENT PLAN: Frequency/Duration: daily for 5 day trial  Rehabilitation Potential For Stated Goals: GUARDED      RECOMMENDED REHABILITATION/EQUIPMENT: (at time of discharge pending progress): Continue Skilled Therapy and will determine when pain is under control and he is able to participate more, which DC destination is apprpriate: [facility] vs SNF. HISTORY:   History of Present Injury/Illness (Reason for Referral): Admitted with JODI after several falls at his [facility]. I am uncertain what his level of function was prior to admission as he is is not able interact or answer any questions for me this am  Past Medical History/Comorbidities:   Mr. Cherise Butler  has a past medical history of Arthritis; Hypertension; and Other unknown and unspecified cause of morbidity or mortality.   Mr. Cherise Butler  has a past surgical history that includes urological.  Social History/Living Environment:   [address] Name: [facility] at [address] [place] [address] unit  # Steps to Enter: 0  One/Two Story Residence: One story  Living Alone: No  Support Systems: Child(jermaine), Assisted living  Patient Expects to be Discharged to[de-identified] Skilled nursing facility  Current DME Used/Available at Home: Lunette Leghorn, rolling  Tub or Shower Type: Shower  Prior Level of Function/Work/Activity:  Unable to acquire this info from him this am. He does [facility] [place] [facility] [facility] unit      Number of Personal Factors/Comorbidities that affect the Plan of Care: 1-2: MODERATE COMPLEXITY   EXAMINATION:   Most Recent Physical Functioning:   Gross Assessment:  AROM: Generally decreased, functional (B LEs)  Strength: Generally decreased, functional (B LEs grossly 3-/5)  Sensation: Intact (B LEs)               Posture:  Posture Assessment: Forward head, Rounded shoulders  Balance:  Sitting: With support  Standing:  (NT) Bed Mobility:  Rolling: Total assistance  Supine to Sit: Total assistance;Assist x2  Sit to Supine: Total assistance;Assist x2  Scooting: Total assistance;Assist x2  Wheelchair Mobility:     Transfers:     Gait:             Body Structures Involved:  1. Bones  2. Joints  3. Muscles Body Functions Affected:  1. Sensory/Pain  2. Neuromusculoskeletal  3. Movement Related Activities and Participation Affected:  1. General Tasks and Demands  2. Communication  3. Mobility  4. Self Care   Number of elements that affect the Plan of Care: 4+: HIGH COMPLEXITY   CLINICAL PRESENTATION:   Presentation: Evolving clinical presentation with unstable and unpredictable characteristics: HIGH COMPLEXITY   CLINICAL DECISION MAKING:   Randee Jacobs -PAC 6 Clicks   Basic Mobility Inpatient Short Form  How much difficulty does the patient currently have. .. Unable A Lot A Little None   1. Turning over in bed (including adjusting bedclothes, sheets and blankets)? [ ] 1   [X] 2   [ ] 3   [ ] 4   2. Sitting down on and standing up from a chair with arms ( e.g., wheelchair, bedside commode, etc.)   [X] 1   [ ] 2   [ ] 3   [ ] 4   3. Moving from lying on back to sitting on the side of the bed? [ ] 1   [X] 2   [ ] 3   [ ] 4   How much help from another person does the patient currently need. .. Total A Lot A Little None   4. Moving to and from a bed to a chair (including a wheelchair)? [X] 1   [ ] 2   [ ] 3   [ ] 4   5. Need to walk in hospital room? [X] 1   [ ] 2   [ ] 3   [ ] 4   6. Climbing 3-5 steps with a railing?    [X] 1   [ ] 2   [ ] 3   [ ] 4   © 2007, Trustees of [address] [place] [address] [address] [address], under license to [employer]. All rights reserved    Score:  Initial: 8 Most Recent: X (Date: -- )     Interpretation of Tool:  Represents activities that are increasingly more difficult (i.e. Bed mobility, Transfers, Gait). Score 24 23 22-20 19-15 14-10 9-7 6       Modifier CH CI CJ CK CL CM CN         · Mobility - Walking and Moving Around:               - CURRENT STATUS:    CM - 80%-99% impaired, limited or restricted               - GOAL STATUS:           CL - 60%-79% impaired, limited or restricted               - D/C STATUS:                       ---------------To be determined---------------  [place] / Product Type: Medicare /       Medical Necessity:     · Skilled intervention continues to be required due to assess for function and ability to participate in PT. Awaiting ortho recommendations as well. Reason for Services/Other Comments:  · Patient continues to require skilled intervention due to medical complications and patient unable to attend/participate in therapy as expected. Use of outcome tool(s) and clinical judgement create a POC that gives a: Difficult prediction of patient's progress: HIGH COMPLEXITY                 TREATMENT:   (In addition to Assessment/Re-Assessment sessions the following treatments were rendered)   Pre-treatment Symptoms/Complaints:  Pain with any type movement  Pain: Initial:   Pain Location 1: Leg, Head, Arm, Back  Pain Intervention(s) 1: Elevation, Repositioned, Emotional support  Post Session:  Calm after we return to bed on sidelying      Assessment/Reassessment only, no treatment provided today     Braces/Orthotics/Lines/Etc:   · pino catheter  · O2 Device: Room air  Treatment/Session Assessment:    · Response to Treatment:  Tolerated with much pain.  Unable to communicate with us this am  · Interdisciplinary Collaboration:  · Physical Therapist  · Occupational Therapist  · Registered Nurse  · After treatment position/precautions:  · Supine in bed  · Bed alarm/tab alert on  · Bed/Chair-wheels locked  · Bed in low position  · Call light within reach  · RN notified  · Side rails x 3  · Compliance with Program/Exercises: Will assess as treatment progresses. · Recommendations/Intent for next treatment session: \"Next visit will focus on advancements to more challenging activities and reduction in assistance provided\".   Total Treatment Duration:  PT Patient Time In/Time Out  Time In: 0725  Time Out: 34[address] [facility], PT

## 2017-05-24 NOTE — PROGRESS NOTES
Problem: Dysphagia (Adult)  Goal: *Acute Goals and Plan of Care (Insert Text)  Goals   None    SPEECH LANGUAGE PATHOLOGY: BEDSIDE SWALLOW NOTE: INITIAL ASSESSMENT AND DISCHARGE     NAME/AGE/GENDER: Donald Villalba is a 80 y.o. male  DATE: 5/24/2017  PRIMARY DIAGNOSIS: JODI (acute kidney injury) ([facility] [address]       ICD-10: Treatment Diagnosis: R13.11 Dysphagia, Oral dysphagia  INTERDISCIPLINARY COLLABORATION: Speech Therapist and Registered Nurse  PRECAUTIONS/ALLERGIES: Review of patient's allergies indicates no known allergies. ASSESSMENT:   Based on the objective data described below, Mr. Marley López presents with decreased mastication quality due to decreased denture fit. Diet modified by RD to mechanical soft. Patient tolerated any/all consistencies without overt s/sx. Moderately increased mastication time with peaches, although adequate. Solid deferred. Recommend continue mechanical soft textures/thin liquids. Medication as tolerated. No further skilled speech therapy intervention indicated. ?????? ? ? This section established at most recent assessment??????????  PROBLEM LIST (Impairments causing functional limitations):  1. Oral dysphagia         PLAN OF CARE:   Patient will benefit from skilled intervention to address the following impairments. RECOMMENDATIONS AND PLANNED INTERVENTIONS (Benefits and precautions of therapy have been discussed with the patient.):  · continue prescribed diet  · PO:  Mechanical soft  · Liquids:  regular thin  MEDICATIONS:  · Crushed in puree  · With liquid  · as tolerated  COMPENSATORY STRATEGIES/MODIFICATIONS INCLUDING:  · Alternate liquids/solids  · Small sips and bites  OTHER RECOMMENDATIONS (including follow up treatment recommendations): · Family training/education  · Patient education  RECOMMENDED DIET MODIFICATIONS DISCUSSED WITH:  · Family  · Patient  FREQUENCY/DURATION: Discontinue   RECOMMENDED REHABILITATION/EQUIPMENT: (at time of discharge pending progress):   None. SUBJECTIVE:   \"Who are you? Have we met? \"  History of Present Injury/Illness: Mr. Elidia Lacey  has a past medical history of Arthritis; Hypertension; and Other unknown and unspecified cause of morbidity or mortality. Marcelina Davis He also  has a past surgical history that includes urological.   Present Symptoms:    Pain Intensity 1: 0  Pain Location 1: Leg, Head, Arm, Back  Pain Intervention(s) 1: Elevation, Repositioned, Emotional support  Current Medications:   No current facility-administered medications on file prior to encounter. Current Outpatient Prescriptions on File Prior to Encounter   Medication Sig Dispense Refill    bisacodyl (DULCOLAX) 5 mg EC tablet Take 5 mg by mouth daily as needed for Constipation.  calcipotriene (DOVONOX) 0.005 % ointment Apply  to affected area two (2) times a day.  ferrous gluconate 324 mg (38 mg iron) tablet Take 324 mg by mouth Daily (before breakfast).  ranitidine (ZANTAC) 150 mg tablet Take 150 mg by mouth two (2) times a day.  cyanocobalamin 1,000 mcg tablet Take 1,000 mcg by mouth daily.  cholecalciferol (VITAMIN D3) 1,000 unit cap Take 1,000 Units by mouth daily.  acetaminophen (TYLENOL) 325 mg tablet Take 500 mg by mouth every four (4) hours as needed for Pain.  aspirin delayed-release 325 mg tablet TAKE 1 TABLET BY MOUTH ONCE DAILY. 30 Tab 11    DIGOX 125 mcg tablet TAKE 1 TABLET BY MOUTH ONCE DAILY. 30 Tab 11    DILT- mg XR capsule TAKE 1 CAPSULE BY MOUTH ONCE DAILY. 30 Cap 11    furosemide (LASIX) 40 mg tablet TAKE 1 TABLET BY MOUTH ONCE DAILY. 30 Tab 11    polyethylene glycol (MIRALAX) 17 gram packet Take 17 g by mouth daily.  POTASSIUM CHLORIDE SR 10 MEQ TAB 10 mEq daily.  sennosides 8.6 mg cap Take 1 Cap by mouth.  loperamide (IMODIUM) 2 mg capsule Take 2 mg by mouth.          Current Dietary Status:  Mechanical soft/thin           History of reflux:  YES   · Reflux medication:zantac  Social History/Home Situation:    Home Environment: [address] [place] [address] Road Name: [facility] at [address] [facility] care unit  # Steps to Enter: 0  One/Two Story Residence: One story  Living Alone: No  Support Systems: Child(jermaine), Assisted living  Patient Expects to be Discharged to[de-identified] Skilled nursing facility  Current DME Used/Available at Home: Tre Altes, rolling  Tub or Shower Type: Shower      OBJECTIVE:   Respiratory Status:        CXR Results:IMPRESSION: No acute abnormality  MRI/CT Results:IMPRESSION: Chronic changes, No acute abnormality  Oral Motor Structure/Speech:  Oral-Motor Structure/Motor Speech  Labial: Decreased seal  Dentition: Upper & lower dentures  Oral Hygiene: good  Lingual: Decreased rate     Cognitive and Communication Status:  Neurologic State: Alert; Appropriate for age  Orientation Level: Oriented to person;Oriented to place  Cognition: Decreased command following;Decreased attention/concentration (Tribe)  Perception: Appears intact  Perseveration: No perseveration noted  Safety/Judgement: Decreased insight into deficits     BEDSIDE SWALLOW EVALUATION  Oral Assessment:  Oral Assessment  Labial: Decreased seal  Dentition: Upper & lower dentures  Oral Hygiene: good  Lingual: Decreased rate  P.O. Trials:  Patient Position: upright in bed     The patient was given bite/sip amounts of the following:   Consistency Presented: Thin liquid;Puree;Mixed consistency; Ice chips  How Presented: SLP-fed/presented;Self-fed/presented;Cup/sip;Spoon;Straw;Successive swallows     ORAL PHASE:  Bolus Acceptance: No impairment  Bolus Formation/Control: Impaired  Propulsion: Delayed (# of seconds); Discoordination  Type of Impairment: Delayed;Mastication;Lip closure;Piecemeal  Oral Residue: 10-50% of bolus     PHARYNGEAL PHASE:  Initiation of Swallow: Delayed (# of seconds)  Laryngeal Elevation: Functional  Aspiration Signs/Symptoms: None  Vocal Quality: No impairment     Effective Modifications:  Alternate liquids/solids;Small sips and bites     Pharyngeal Phase Characteristics: No impairment, issues, or problems      OTHER OBSERVATIONS:  Rate/bite size: WNL         Endurance:  Questionable            Comments: mechanical soft textures to reduce fatigue       Tool Used: Dysphagia Outcome and Severity Scale (TAWNYA)     Score Comments   Normal Diet  [ ] 7 With no strategies or extra time needed   Functional Swallow  [ ] 6 May have mild oral or pharyngeal delay         Mild Dysphagia     [X] 5 Which may require one diet consistency restricted (those who demonstrate penetration which is entirely cleared on MBS would be included)   Mild-Moderate Dysphagia  [ ] 4 With 1-2 diet consistencies restricted         Moderate Dysphagia  [ ] 3 With 2 or more diet consistencies restricted         Moderately Severe Dysphagia  [ ] 2 With partial PO strategies (trials with ST only)         Severe Dysphagia  [ ] 1 With inability to tolerate any PO safely            Score:  Initial: 5 Most Recent: X (Date: -- )   Interpretation of Tool: The Dysphagia Outcome and Severity Scale (TAWNYA) is a simple, easy-to-use, 7-point scale developed to systematically rate the functional severity of dysphagia based on objective assessment and make recommendations for diet level, independence level, and type of nutrition.        Score 7 6 5 4 3 2 1   Modifier CH CI CJ CK CL CM CN   · Swallowing:               - CURRENT STATUS:           CJ - 20%-39% impaired, limited or restricted               - GOAL STATUS:                   CJ - 20%-39% impaired, limited or restricted               - D/C STATUS:                       CJ - 20%-39% impaired, limited or restricted  [place] / Product Type: Medicare /       TREATMENT:         (In addition to Assessment/Re-Assessment sessions the following treatments were rendered)  Assessment/Reassessment only, no treatment provided today  MODALITIES: ORAL MOTOR  EXERCISES:                                                                                                                                                                       LARYNGEAL / PHARYNGEAL EXERCISES:                                                                                                                                      __________________________________________________________________________________________________  Safety:   After treatment position/precautions:  · Call light within reach  · Family at bedside  · Upright in Bed  Treatment Assessment:  Patient actively participated in evaluation. Progression/Medical Necessity:   · Discontinue  Compliance with Program/Exercises: compliant all of the time. Reason for Continuation of Services/Other Comments:  · Discontinue  Recommendations/Intent for next treatment session: Discontinue  Total Treatment Duration:  Time In: 4184  Time Out: 4801 EVENS Pineda MS, CCC-SLP

## 2017-05-24 NOTE — PROGRESS NOTES
Pt attempting to get out of bed and pull pino catheter out. Pt combative, yelling, kicking and hitting staff. Haldol 0.5 mg IVP administered slowly, see MAR. [place] applied to patient's hands. No distress noted. Bed alarm on. Will continue to monitor.

## 2017-05-24 NOTE — PROGRESS NOTES
Problem: Self Care Deficits Care Plan (Adult)  Goal: *Acute Goals and Plan of Care (Insert Text)  1. Patient will perform grooming with min assist.  2. Patient will perform Upper body dressing with mod assist  3.. Patient will perform upper body bathing with mod assist.  4. Patient will perform toilet transfers with mod assist  5. Patient will participate in 30 + minutes of ADL/ therapeutic exercise/therapeutic activity with min rest breaks to increase activity tolerance for self care. Goals to be achieved in 7 days. OCCUPATIONAL THERAPY: Initial Assessment and AM 5/24/2017  INPATIENT: Hospital Day: 2  [place] / [place] / Product Type: Medicare /      NAME/AGE/GENDER: Glorine Lung is a 80 y.o. male    PRIMARY DIAGNOSIS:  JODI (acute kidney injury) ([facility] [address] JODI (acute kidney injury) ([facility] [address] JODI (acute kidney injury) ([facility] [address]        ICD-10: Treatment Diagnosis:        · Generalized Muscle Weakness (M62.81)  · Other lack of cordination (R27.8)  · Repeated Falls (R29.6)   Precautions/Allergies:         Review of patient's allergies indicates no known allergies. ASSESSMENT:      Mr. Duane Mis presents supine in bed with above diagnosis and compression fracture at L 1,2,5. He is total assist with ADLs at this time. He has no direction following and only opens his eyes minimally. He would benefit from skilled OT services as he was ambulating and assisting with ADLS prior to fall per chart review. Will follow. This section established at most recent assessment   PROBLEM LIST (Impairments causing functional limitations):  1. Decreased Strength  2. Decreased ADL/Functional Activities  3. Decreased Transfer Abilities  4. Decreased Ambulation Ability/Technique  5. Decreased Balance  6. Increased Pain  7. Decreased Flexibility/Joint Mobility    INTERVENTIONS PLANNED: (Benefits and precautions of occupational therapy have been discussed with the patient.)  1.  Activities of daily living training  2. Balance training  3. Clothing management  4. Cognitive training  5. Donning&doffing training  6. Neuromuscular re-eduation  7. Theraputic activity  8. Theraputic exercise      TREATMENT PLAN: Frequency/Duration: Follow patient 2 times  to address above goals. Rehabilitation Potential For Stated Goals: GOOD      RECOMMENDED REHABILITATION/EQUIPMENT: (at time of discharge pending progress): Continue Skilled Therapy and Rehab. OCCUPATIONAL PROFILE AND HISTORY:   History of Present Injury/Illness (Reason for Referral):  Decreased self care and functional mobility  Past Medical History/Comorbidities:   Mr. Yanet Velásquez  has a past medical history of Arthritis; Hypertension; and Other unknown and unspecified cause of morbidity or mortality. Mr. Yanet Velásquez  has a past surgical history that includes urological.  Social History/Living Environment:   [facility] [address] Name: [facility] [place] [facility] [address] [facility] care unit  # Steps to Enter: 0  One/Two Story Residence: One story  Living Alone: No  Support Systems: Child(jermaine), Assisted living  Patient Expects to be Discharged to[de-identified] Skilled nursing facility  Current DME Used/Available at Home: Walker, rolling  Tub or Shower Type: Shower  Prior Level of Function/Work/Activity:  Was ambulating with walker and performing ADLS prior to fall in memory care unit      Number of Personal Factors/Comorbidities that affect the Plan of Care: Brief history (0):  LOW COMPLEXITY   ASSESSMENT OF OCCUPATIONAL PERFORMANCE[de-identified]   Activities of Daily Living:           Basic ADLs (From Assessment) Complex ADLs (From Assessment)   Basic ADL  Feeding: Total assistance  Oral Facial Hygiene/Grooming: Total assistance  Bathing: Total assistance  Upper Body Dressing: Total assistance  Lower Body Dressing: Total assistance  Toileting:  Total assistance (catheter and brief in place)     Grooming/Bathing/Dressing Activities of Daily Living     Cognitive Retraining  Safety/Judgement: Decreased insight into deficits; Fall prevention                       Bed/Mat Mobility  Rolling: Total assistance  Supine to Sit: Total assistance;Assist x2  Sit to Supine: Total assistance;Assist x2  Scooting: Total assistance;Assist x2          Most Recent Physical Functioning:   Gross Assessment:                  Posture:  Posture Assessment: Forward head, Rounded shoulders  Balance:  Sitting: With support  Standing:  (NT) Bed Mobility:  Rolling: Total assistance  Supine to Sit: Total assistance;Assist x2  Sit to Supine: Total assistance;Assist x2  Scooting: Total assistance;Assist x2  Wheelchair Mobility:     Transfers:                       Patient Vitals for the past 6 hrs:       BP BP Patient Position SpO2 Pulse   17 0255 115/75 At rest 97 % (!) 103   17 0821 114/58 At rest;Supine 98 % 84        Mental Status  Neurologic State: Drowsy, Eyes open to pain, Eyes open to voice, Eyes open to stimulus  Orientation Level: Oriented to person, Disoriented to place, Disoriented to situation, Disoriented to time  Cognition: Decreased command following, Decreased attention/concentration, Memory loss, Impaired decision making  Perception: Appears intact  Perseveration: No perseveration noted  Safety/Judgement: Decreased insight into deficits, Fall prevention                               Physical Skills Involved:  1. Balance  2. Mobility  3. Strength  4. Endurance Cognitive Skills Affected (resulting in the inability to perform in a timely and safe manner):  1. Understanding  2. Problem Solving  3. Mental Sequencing  4. Remembering Psychosocial Skills Affected:  1. Interpersonal Interactions  2. Routines and Behaviors  3. Active Use of Coping Strategies  4.  Environmental Adaptations   Number of elements that affect the Plan of Care: 5+:  HIGH COMPLEXITY   CLINICAL DECISION MAKIN [facility] In[facility] Short Form  How much help from another person does the patient currently need. .. Total A Lot A Little None   1. Putting on and taking off regular lower body clothing? [X] 1   [ ] 2   [ ] 3   [ ] 4   2. Bathing (including washing, rinsing, drying)? [X] 1   [ ] 2   [ ] 3   [ ] 4   3. Toileting, which includes using toilet, bedpan or urinal?   [X] 1   [ ] 2   [ ] 3   [ ] 4   4. Putting on and taking off regular upper body clothing? [X] 1   [ ] 2   [ ] 3   [ ] 4   5. Taking care of personal grooming such as brushing teeth? [X] 1   [ ] 2   [ ] 3   [ ] 4   6. Eating meals? [X] 1   [ ] 2   [ ] 3   [ ] 4   © 2007, Trustees of [address] [place] [address] [address] [id number], under license to [employer]. All rights reserved    Score:  Initial: 6 Most Recent: X (Date: -- )     Interpretation of Tool:  Represents activities that are increasingly more difficult (i.e. Bed mobility, Transfers, Gait). Score 24 23 22-20 19-15 14-10 9-7 6       Modifier CH CI CJ CK CL CM CN         · Self Care:               - CURRENT STATUS:    CN - 100% impaired, limited or restricted               - GOAL STATUS:           CJ - 20%-39% impaired, limited or restricted               - D/C STATUS:                       ---------------To be determined---------------  [place] / Product Type: Medicare /       Medical Necessity:     · Patient is expected to demonstrate progress in strength, balance, coordination and functional technique to decrease assistance required with self care and functional mobility. Reason for Services/Other Comments:  · Patient continues to require skilled intervention due to decreased self care and functional mobility.    Use of outcome tool(s) and clinical judgement create a POC that gives a: MODERATE COMPLEXITY             TREATMENT:   (In addition to Assessment/Re-Assessment sessions the following treatments were rendered)      Pre-treatment Symptoms/Complaints:  None resting   Pain: Initial:   Pain Intensity 1: 0 (at rest)  Post Session:  0/10 at rest      Assessment/Reassessment only, no treatment provided today     Braces/Orthotics/Lines/Etc:   · pino catheter  · O2 Device: Room air  Treatment/Session Assessment:    · Response to Treatment:  Total assist, shouting out with any movement or touching of his extremities  · Interdisciplinary Collaboration:  · Physical Therapist  · Occupational Therapist  · Registered Nurse  · After treatment position/precautions:  · Supine in bed  · Bed alarm/tab alert on  · Bed/Chair-wheels locked  · Bed in low position  · Call light within reach  · RN notified  · Compliance with Program/Exercises: Will assess as treatment progresses. · Recommendations/Intent for next treatment session: \"Next visit will focus on advancements to more challenging activities and reduction in assistance provided for self care and functional mobility\".   Total Treatment Duration:  OT Patient Time In/Time Out  Time In: 3199  Time Out: [address], OT

## 2017-05-25 NOTE — PROGRESS NOTES
Problem: Nutrition Deficit  Goal: *Optimize nutritional status  Nutrition Follow Up:  Reason for assessment: Referral received from nursing admission Malnutrition Screening Tool for unsure of weight loss and eating poorly r/t decreased appetite. (5/23)  Assessment:   Diet order(s): Consistent CHO, GI soft-ground meats with Glucerna Shake supplement all meals  Pt sleeping; noted patient now wearing his dentures; feeding assistance provided at meals. Only weight available: 59 kg on admission-weight source not specified. Macronutrient needs:  EER: 3195-6119 kcal /day (25-30 kcal/kg BW)  EPR: 59-71 grams protein/day (1-1.2 gms/kg BW)-used BW r/t unsure height prior to compression fractures  Intake/Comparative Standards: One recorded intake at 3%; per Dyane Lung, patient ate ~ 50% lunch meals + ~25% glucerna  Shake supplement this afternoon; fed by family at lunch. Based on limited data, patient potentially meets ~ 45% estimated kcal and protein goal (meals + ~ 1 glucerna consumed/day)   Intervention:  1. Meals and snacks: Continue current diet; Pt may need assistance with feeding r/t altered mental status. 2. Nutrition Supplement Therapy: Continue Glucerna Shake supplement all meals; flavor preference of chocolate noted in [employer]. 3. Collaboration of Nutrition Services: Interdisciplinary Rounds, Misty Rubio, RN  4.  Ordered weight  Vimal Zelaya, [address], LD, MPH  [phone]

## 2017-05-25 NOTE — PROGRESS NOTES
Pt continues to get out of bed and pull at pino catheter. MD notified. Orders received. Will continue to monitor. MD states if Seroquel is non-effective in 30 min, 25 mg Seroquel PO can be administered a second time.

## 2017-05-25 NOTE — REHAB NOTE
PT note: checked on patient this morning for therapy but he was asleep and did not rouse easily. We will check on him tomorrow.     Bryan Garcia, PT

## 2017-05-25 NOTE — PROGRESS NOTES
Daughter called and states that she no longer wants her father to go to [facility]. States she has been told that he would not be eligible for Medicaid and she now wants him to go to [facility]. States she has talked to Deion Kaur over there who told her they have a short term bed and would have a self pay bed if he needed to stay after rehab. Call to Jamia at [facility] - message left. Notified Shikha Lopes who will put referral in [facility].

## 2017-05-25 NOTE — PROGRESS NOTES
Pt becoming increasingly combative and continues to try to get out of bed. Pt pulled out IV. Mitts on both hands. MD notified. Orders received. Will continue to monitor.

## 2017-05-25 NOTE — PROGRESS NOTES
Pt attempting to pull out pino catheter and get out of bed. Haldol 0.5 mg IVP administered slowly, see MAR. Staff present. No distress noted. Will continue to monitor.

## 2017-05-25 NOTE — PROGRESS NOTES
Hospitalist Progress Note    2017  Admit Date: 2017 11:15 AM   NAME: Natasha Whitfield   :  3/12/1917   MRN:  [id number]   Attending: Angi Ariza MD  PCP:  Cruz Velásquez MD  Treatment Team: Attending Provider: Angi Ariza MD; Utilization Review: Ramesh Garza    DNR   SUBJECTIVE:   Mr. Herberth Clemons is a 79 yo male who presented with increasing confusion. CT showing chronic changes. PT also sustained fall, xray L-spine showing compression fx L1,2,5 however age indeterminate. UA showing UTI. Pt with leukocytosis this AM.  Creatinine elevated on admission 1.76, trending up today at 2.29. CXR without acute findings. Pt confused, no family at bedside.       Past Medical History:   Diagnosis Date    Arthritis     Hypertension     Other unknown and unspecified cause of morbidity or mortality     a-fib     Recent Results (from the past 24 hour(s))   GLUCOSE, POC    Collection Time: 17  4:16 PM   Result Value Ref Range    Glucose (POC) 116 (H) 65 - 100 mg/dL   GLUCOSE, POC    Collection Time: 17  9:23 PM   Result Value Ref Range    Glucose (POC) 231 (H) 65 - 838 mg/dL   METABOLIC PANEL, BASIC    Collection Time: 17  5:30 AM   Result Value Ref Range    Sodium 144 136 - 145 mmol/L    Potassium 4.8 3.5 - 5.1 mmol/L    Chloride 105 98 - 107 mmol/L    CO2 18 (L) 21 - 32 mmol/L    Anion gap 21 (H) 7 - 16 mmol/L    Glucose 212 (H) 65 - 100 mg/dL    BUN 37 (H) 8 - 23 MG/DL    Creatinine 2.29 (H) 0.8 - 1.5 MG/DL    GFR est AA 34 (L) >60 ml/min/1.73m2    GFR est non-AA 28 (L) >60 ml/min/1.73m2    Calcium 9.7 8.3 - 10.4 MG/DL   CBC W/O DIFF    Collection Time: 17  5:30 AM   Result Value Ref Range    WBC 13.0 (H) 4.3 - 11.1 K/uL    RBC 2.76 (L) 4.23 - 5.67 M/uL    HGB 9.4 (L) 13.6 - 17.2 g/dL    HCT 28.7 (L) 41.1 - 50.3 %    .0 (H) 79.6 - 97.8 FL    MCH 34.1 (H) 26.1 - 32.9 PG    MCHC 32.8 31.4 - 35.0 g/dL    RDW 14.2 11.9 - 14.6 %    PLATELET 950 901 - 787 K/uL    MPV 10.5 (L) 10.8 - 14.1 FL   GLUCOSE, POC    Collection Time: 05/25/17  7:24 AM   Result Value Ref Range    Glucose (POC) 233 (H) 65 - 100 mg/dL   URINALYSIS W/ RFLX MICROSCOPIC    Collection Time: 05/25/17  9:22 AM   Result Value Ref Range    Color AYE      Appearance TURBID      Specific gravity 1.027 (H) 1.001 - 1.023      pH (UA) 5.0 5.0 - 9.0      Protein 100 (A) NEG mg/dL    Glucose 100 mg/dL    Ketone 15 (A) NEG mg/dL    Bilirubin MODERATE (A) NEG      Blood LARGE (A) NEG      Urobilinogen 1.0 0.2 - 1.0 EU/dL    Nitrites NEGATIVE  NEG      Leukocyte Esterase MODERATE (A) NEG      WBC 10-20 0 /hpf    RBC 10-20 0 /hpf    Epithelial cells 0-3 0 /hpf    Bacteria 1+ (H) 0 /hpf    Casts 5-10 0 /lpf    Mucus TRACE 0 /lpf   GLUCOSE, POC    Collection Time: 05/25/17 10:38 AM   Result Value Ref Range    Glucose (POC) 169 (H) 65 - 100 mg/dL     No Known Allergies  Current Facility-Administered Medications   Medication Dose Route Frequency Provider Last Rate Last Dose    cefTRIAXone (ROCEPHIN) 1 g in 0.9% sodium chloride (MBP/ADV) 50 mL  1 g IntraVENous Q24H Merril Squire,  mL/hr at 05/25/17 1117 1 g at 05/25/17 1117    0.9% sodium chloride infusion  100 mL/hr IntraVENous CONTINUOUS Merril Squire,  mL/hr at 05/25/17 1117 100 mL/hr at 05/25/17 1117    aspirin delayed-release tablet 325 mg  325 mg Oral DAILY Fernanda Rehman MD   325 mg at 05/25/17 0818    bisacodyl (DULCOLAX) tablet 5 mg  5 mg Oral DAILY PRN Fernanda Rehman MD        cholecalciferol (VITAMIN D3) tablet 1,000 Units  1,000 Units Oral DAILY Fernanda Rehman MD   1,000 Units at 05/25/17 0818    cyanocobalamin tablet 1,000 mcg  1,000 mcg Oral DAILY Fernanda Rehman MD   1,000 mcg at 05/25/17 0818    digoxin (LANOXIN) tablet 0.125 mg  0.125 mg Oral DAILY Fernanda Rehman MD   0.125 mg at 05/25/17 0818    dilTIAZem CD (CARDIZEM CD) capsule 120 mg  120 mg Oral DAILY Fernanda Rehman MD   120 mg at 05/25/17 5453    ferrous gluconate 324 mg (38 mg iron) tablet 1 Tab  1 Tab Oral ACB Praveena Rosenberg MD   1 Tab at 17 0818    polyethylene glycol (MIRALAX) packet 17 g  17 g Oral DAILY Praveena Rosenberg MD   17 g at 17 0817    famotidine (PEPCID) tablet 20 mg  20 mg Oral BID Praveena Rosenberg MD   20 mg at 17 0818    sodium chloride (NS) flush 5-10 mL  5-10 mL IntraVENous Q8H Praveena Rosenberg MD   10 mL at 17 0546    sodium chloride (NS) flush 5-10 mL  5-10 mL IntraVENous PRN Praveena Rosenberg MD        acetaminophen (TYLENOL) tablet 500 mg  500 mg Oral Q6H PRN Praveena Rosenberg MD        ondansetron (ZOFRAN) injection 4 mg  4 mg IntraVENous Q4H PRN Praveena Rosenberg MD        traMADol (ULTRAM) tablet 50 mg  50 mg Oral Q6H PRN Praveena Rosenberg MD   50 mg at 17 1501    insulin lispro (HUMALOG) injection   SubCUTAneous AC&HS Praveena Rosenberg MD   2 Units at 17 1130    haloperidol lactate (HALDOL) injection 0.5 mg  0.5 mg IntraVENous Q8H PRN Praveena Rosenberg MD   0.5 mg at 17 0332    calcipotriene (DOVONOX) 0.005 % ointment   Topical BID Praveena Rosenberg MD   Stopped at 17 0900       Review of Systems unable to obtain.    PHYSICAL EXAM     Visit Vitals    /62 (BP 1 Location: Left arm, BP Patient Position: At rest)    Pulse 100    Temp 96.5 °F (35.8 °C)    Resp 16    Ht 5' 1\" (1.549 m)    Wt 59 kg (130 lb)    SpO2 100%    BMI 24.56 kg/m2      Temp (24hrs), Av.8 °F (36.6 °C), Min:96.5 °F (35.8 °C), Max:98.6 °F (37 °C)    Oxygen Therapy  O2 Sat (%): 100 % (17 0759)  Pulse via Oximetry: 96 beats per minute (17 0759)  O2 Device: Room air (17 0759)    Intake/Output Summary (Last 24 hours) at 17 1133  Last data filed at 17 0972   Gross per 24 hour   Intake                0 ml   Output              700 ml   Net             -700 ml      General: No acute distress, confused    Lungs: CTA bilaterally. Resp even and nonlabored  Heart:  S1S2 present without murmurs rubs gallops. RRR. No edema  Abdomen: Soft, non tender, non distended. BS present  Extremities: Moves ext spontaneously. No cyanosis  Neurologic:  Confused, does not follow commands    Results summary of Diagnostic Studies/Procedures copied from within [facility] EMR:         [address] Problems    Diagnosis Date Noted    JODI (acute kidney injury) ([facility] [address] 05/23/2017    Memory loss 05/23/2017    Lumbar compression fracture ([facility] [address] 05/23/2017    Leukocytosis 05/23/2017    A-fib ([facility] [address] 36/32/1489    Metabolic encephalopathy 07/27/5591    Hearing loss 05/23/2017    Anemia 05/23/2017    Hyperglycemia 05/23/2017     Plan:    JODI: worsening creatinine, start IVF    UTI: send urine cx, start rocephin, IVF    Leukocytosis:  CBC in AM    Confusion: likely infection related, monitor    Afib: rate controlled.  Continue current regimen    Hypotension: start IVF, monitor    Notes, labs, VS, diagnostic testing reviewed  Time spent with pt 30 min    DVT Prophylaxis: SCDs  Plan of Care Discussed with: Supervising MD Dr. Alcides Spencer, care team, pt   Geremias Reid, NP

## 2017-05-25 NOTE — PROGRESS NOTES
Shift assessment complete via doc flowsheet. Pt alert and disoriented x 4. Pt combative, MD aware. HR irregular, S1S2. Lung sounds CTA bilaterally. Respirations even and unlabored on room air. Abdomen soft and intact. Bowel sounds active in all quadrants. Mitts in place. Bunch catheter patent and draining yellow urine. Pt resting in bed. Bed in low and locked position, side rails up x 3. Call light within reach. Bed alarm on. No distress noted. No other needs expressed. Will continue to monitor.

## 2017-05-25 NOTE — PROGRESS NOTES
Pt calm and alert to person and place. Haldol effective. Mitts removed. No distress noted. Will continue to monitor.

## 2017-05-25 NOTE — PROGRESS NOTES
Assessment done via doc flow sheet. Pt resting in bed, alert and disoriented x4. Respirations easy & regular, lungs CTA. No agitation noted, moves all over the place in bed, repositioned with pillows, head of bed elevated. Bunch patent, draining liana urine. Bed low & locked, side rails x3 up, with bed alarm set.

## 2017-05-25 NOTE — PROGRESS NOTES
While placing Mitts on patient, patient continued to attempt to hit and kick staff. Arrived in pt's room and pt's left forearm had a skin tear. Steri-strips applied to pt's skin tear and wrapped with Kerlex. No distress noted. Will continue to monitor.

## 2017-05-26 NOTE — PROGRESS NOTES
Dr Guy Long called for concern over little urine output. Catheter balloon deflated and catheter inserted further until urine return and then re-inflated. Will monitor output.                 At 16:00, urine output 250 ml

## 2017-05-26 NOTE — PROGRESS NOTES
Shift assessment complete via doc flowsheet. Pt alert and oriented to person and place, but disoriented to time and situation. HR irregular, S1S2. Respirations even and unlabored on room air. Lung sounds CTA bilaterally. Abdomen soft and intact. Bowel sounds active in all quadrants. Peripheral IV patent with IVF infusing. Bunch catheter patent with liana urine draining. New Allevyn applied to sacrum/coccyx. Pt denies pain. Pt resting in bed. Bed in low and locked position, side rails up x 3. Call light within reach. Pt instructed to call for assistance. No other needs expressed. No distress noted. Will continue to monitor.

## 2017-05-26 NOTE — PROGRESS NOTES
Hospitalist Progress Note    2017  Admit Date: 2017 11:15 AM   NAME: Len Doctor   :  3/12/1917   MRN:  [id number]   Attending: Dougie Tijerina MD  PCP:  Vinay Cantu MD  Treatment Team: Attending Provider: Dougie Tijerina MD; Utilization Review: Bhanu Damian    DNR   SUBJECTIVE:   Mr. Elizabeth Agustin is a 81 yo male who presented with increasing confusion. CT showing chronic changes. PT also sustained fall, xray L-spine showing compression fx L1,2,5 however age indeterminate. UA showing UTI. Urine cx NGTD. Creatinine elevated. CXR without acute findings. Hypernatremic today. Pt confused, no family at bedside. Family requested last night for Hospice consult.      Past Medical History:   Diagnosis Date    Arthritis     Hypertension     Other unknown and unspecified cause of morbidity or mortality     a-fib     Recent Results (from the past 24 hour(s))   GLUCOSE, POC    Collection Time: 17 10:38 AM   Result Value Ref Range    Glucose (POC) 169 (H) 65 - 100 mg/dL   GLUCOSE, POC    Collection Time: 17  4:28 PM   Result Value Ref Range    Glucose (POC) 200 (H) 65 - 100 mg/dL   GLUCOSE, POC    Collection Time: 17 10:01 PM   Result Value Ref Range    Glucose (POC) 154 (H) 65 - 077 mg/dL   METABOLIC PANEL, BASIC    Collection Time: 17  5:48 AM   Result Value Ref Range    Sodium 147 (H) 136 - 145 mmol/L    Potassium 4.3 3.5 - 5.1 mmol/L    Chloride 109 (H) 98 - 107 mmol/L    CO2 20 (L) 21 - 32 mmol/L    Anion gap 18 (H) 7 - 16 mmol/L    Glucose 171 (H) 65 - 100 mg/dL    BUN 46 (H) 8 - 23 MG/DL    Creatinine 2.18 (H) 0.8 - 1.5 MG/DL    GFR est AA 36 (L) >60 ml/min/1.73m2    GFR est non-AA 30 (L) >60 ml/min/1.73m2    Calcium 9.0 8.3 - 10.4 MG/DL   CBC WITH AUTOMATED DIFF    Collection Time: 17  5:48 AM   Result Value Ref Range    WBC 12.0 (H) 4.3 - 11.1 K/uL    RBC 3.00 (L) 4.23 - 5.67 M/uL    HGB 10.1 (L) 13.6 - 17.2 g/dL    HCT 30.9 (L) 41.1 - 50.3 %    .0 (H) 79.6 - 97.8 FL    MCH 33.7 (H) 26.1 - 32.9 PG    MCHC 32.7 31.4 - 35.0 g/dL    RDW 14.4 11.9 - 14.6 %    PLATELET 139 828 - 228 K/uL    MPV 10.7 (L) 10.8 - 14.1 FL    DF AUTOMATED      NEUTROPHILS 78 43 - 78 %    LYMPHOCYTES 9 (L) 13 - 44 %    MONOCYTES 12 4.0 - 12.0 %    EOSINOPHILS 0 (L) 0.5 - 7.8 %    BASOPHILS 0 0.0 - 2.0 %    IMMATURE GRANULOCYTES 0.9 0.0 - 5.0 %    ABS. NEUTROPHILS 9.4 (H) 1.7 - 8.2 K/UL    ABS. LYMPHOCYTES 1.0 0.5 - 4.6 K/UL    ABS. MONOCYTES 1.4 (H) 0.1 - 1.3 K/UL    ABS. EOSINOPHILS 0.0 0.0 - 0.8 K/UL    ABS. BASOPHILS 0.0 0.0 - 0.2 K/UL    ABS. IMM.  GRANS. 0.1 0.0 - 0.5 K/UL   GLUCOSE, POC    Collection Time: 05/26/17  8:09 AM   Result Value Ref Range    Glucose (POC) 164 (H) 65 - 100 mg/dL     No Known Allergies  Current Facility-Administered Medications   Medication Dose Route Frequency Provider Last Rate Last Dose    dextrose 5% infusion  75 mL/hr IntraVENous CONTINUOUS Scooby Judd NP        cefTRIAXone (ROCEPHIN) 1 g in 0.9% sodium chloride (MBP/ADV) 50 mL  1 g IntraVENous Q24H Scooby Judd  mL/hr at 05/25/17 1117 1 g at 05/25/17 1117    aspirin delayed-release tablet 325 mg  325 mg Oral DAILY Viet Drake MD   325 mg at 05/26/17 0827    bisacodyl (DULCOLAX) tablet 5 mg  5 mg Oral DAILY PRN Viet Drake MD        cholecalciferol (VITAMIN D3) tablet 1,000 Units  1,000 Units Oral DAILY Viet Drake MD   1,000 Units at 05/26/17 0827    cyanocobalamin tablet 1,000 mcg  1,000 mcg Oral DAILY Viet Drake MD   1,000 mcg at 05/26/17 0827    digoxin (LANOXIN) tablet 0.125 mg  0.125 mg Oral DAILY Viet Drake MD   0.125 mg at 05/26/17 9593    dilTIAZem CD (CARDIZEM CD) capsule 120 mg  120 mg Oral DAILY Viet Drake MD   120 mg at 05/26/17 0827    ferrous gluconate 324 mg (38 mg iron) tablet 1 Tab  1 Tab Oral ACB Viet Drake MD   1 Tab at 05/26/17 0827    polyethylene glycol (MIRALAX) packet 17 g  17 g Oral DAILY Viet Drake MD   17 g at 17 0416    famotidine (PEPCID) tablet 20 mg  20 mg Oral BID Viet Drake MD   20 mg at 17 0827    sodium chloride (NS) flush 5-10 mL  5-10 mL IntraVENous Q8H Viet Drake MD   Stopped at 17 2243    sodium chloride (NS) flush 5-10 mL  5-10 mL IntraVENous PRN Viet Drake MD        acetaminophen (TYLENOL) tablet 500 mg  500 mg Oral Q6H PRN Viet Drake MD        ondansetron [facility]) injection 4 mg  4 mg IntraVENous Q4H PRN Viet Drake MD        traMADol (ULTRAM) tablet 50 mg  50 mg Oral Q6H PRN Viet Drake MD   50 mg at 17 1501    insulin lispro (HUMALOG) injection   SubCUTAneous AC&HS Viet Drake MD   2 Units at 17 6800    haloperidol lactate (HALDOL) injection 0.5 mg  0.5 mg IntraVENous Q8H PRN Viet Drake MD   0.5 mg at 17 0332    calcipotriene (DOVONOX) 0.005 % ointment   Topical BID Viet Drake MD   Stopped at 17 0900       Review of Systems unable to obtain  PHYSICAL EXAM     Visit Vitals    /54 (BP 1 Location: Right arm, BP Patient Position: At rest)    Pulse 76    Temp 96.8 °F (36 °C)    Resp 16    Ht 5' 1\" (1.549 m)    Wt 59 kg (130 lb)    SpO2 90%    BMI 24.56 kg/m2      Temp (24hrs), Av.6 °F (35.9 °C), Min:96 °F (35.6 °C), Max:97.5 °F (36.4 °C)    Oxygen Therapy  O2 Sat (%): 90 % (17 0810)  Pulse via Oximetry: 96 beats per minute (17 0759)  O2 Device: Room air (17 0252)    Intake/Output Summary (Last 24 hours) at 17 1029  Last data filed at 17 0554   Gross per 24 hour   Intake             1733 ml   Output              325 ml   Net             1408 ml        General: No acute distress, confused    Lungs: CTA bilaterally. Resp even and nonlabored  Heart:  S1S2 present without murmurs rubs gallops. RRR. No edema  Abdomen: Soft, non tender, non distended.  BS present  Extremities: Moves ext spontaneously. No cyanosis  Neurologic:  Confused, does not follow commands      Results summary of Diagnostic Studies/Procedures copied from within [facility] EMR:         [address] Problems    Diagnosis Date Noted    [facility] 05/23/2017    Memory loss 05/23/2017    Lumbar compression fracture ([facility] 05/23/2017    Leukocytosis 05/23/2017    A-fib ([facility] 93/72/4762    Metabolic encephalopathy 47/31/2917    Hearing loss 05/23/2017    Anemia 05/23/2017    Hyperglycemia 05/23/2017     Plan:    JODI: creatinine elevated     UTI: urine cx NGTD, continue rocephin, IVF     Leukocytosis: CBC in AM     Confusion: confused but seems more alert today, monitor     Afib: rate controlled.  Continue current regimen     Hypotension: resolved    Hypernatremia: change IVF to [address]     Notes, labs, VS, diagnostic testing reviewed  Time spent with pt 30 min     DVT Prophylaxis: SCDs  Plan of Care Discussed with: Supervising MD Dr. Alcides Spencer, care team, pt   Geremias Reid, NIKIA

## 2017-05-26 NOTE — PROGRESS NOTES
Problem: Self Care Deficits Care Plan (Adult)  Goal: *Acute Goals and Plan of Care (Insert Text)  1. Patient will perform grooming with min assist.  2. Patient will perform Upper body dressing with mod assist Progressing  3.. Patient will perform upper body bathing with mod assist.  4. Patient will perform toilet transfers with mod assistProgressing  5. Patient will participate in 30 + minutes of ADL/ therapeutic exercise/therapeutic activity with min rest breaks to increase activity tolerance for self care. Progressing    Goals to be achieved in 7 days. OCCUPATIONAL THERAPY: Daily Note, Treatment Day: 1st and AM 5/26/2017  INPATIENT: Hospital Day: 4  [place] / [place] / Product Type: Medicare /      NAME/AGE/GENDER: Juvenal  is a 80 y.o. male    PRIMARY DIAGNOSIS:  JODI (acute kidney injury) ([facility] JODI (acute kidney injury) ([facility] JODI (acute kidney injury) ([facility]        ICD-10: Treatment Diagnosis:        · Generalized Muscle Weakness (M62.81)  · Other lack of cordination (R27.8)  · Repeated Falls (R29.6)   Precautions/Allergies:         Review of patient's allergies indicates no known allergies. ASSESSMENT:      Mr. Gwyn Epstein presents supine in bed with above diagnosis and compression fracture at L 1,2,5. He is total assist with ADLs at this time. He has no direction following and only opens his eyes minimally. He would benefit from skilled OT services as he was ambulating and assisting with ADLS prior to fall per chart review. Will follow. 5/26/17- patient supine in bed nursing wanting to get him to the recliner. Patient much more awake and alert today then on eval. He is talking and asking questions. He was total assist when donning socks but did not call out in pain during activity. He donned gown with max assist. He was max to total assist suipne to sit and once stable on EOB he was CGA for static sitting.  He stood with min assist x 2 with RW and step over to recliner with min assist x 2. He will continue to benefit from skilled OT services and is making progress with goals. Will continue with POC. This section established at most recent assessment   PROBLEM LIST (Impairments causing functional limitations):  1. Decreased Strength  2. Decreased ADL/Functional Activities  3. Decreased Transfer Abilities  4. Decreased Ambulation Ability/Technique  5. Decreased Balance  6. Increased Pain  7. Decreased Flexibility/Joint Mobility    INTERVENTIONS PLANNED: (Benefits and precautions of occupational therapy have been discussed with the patient.)  1. Activities of daily living training  2. Balance training  3. Clothing management  4. Cognitive training  5. Donning&doffing training  6. Neuromuscular re-eduation  7. Theraputic activity  8. Theraputic exercise      TREATMENT PLAN: Frequency/Duration: Follow patient 2 times  to address above goals. Rehabilitation Potential For Stated Goals: GOOD      RECOMMENDED REHABILITATION/EQUIPMENT: (at time of discharge pending progress): Continue Skilled Therapy and Rehab. OCCUPATIONAL PROFILE AND HISTORY:   History of Present Injury/Illness (Reason for Referral):  Decreased self care and functional mobility  Past Medical History/Comorbidities:   Mr. Radha Sosa  has a past medical history of Arthritis; Hypertension; and Other unknown and unspecified cause of morbidity or mortality.   Mr. Radha Sosa  has a past surgical history that includes urological.  Social History/Living Environment:   Home Environment: [address] Name: [place] [facility] [address] care unit  # Steps to Enter: 0  One/Two Story Residence: One story  Living Alone: No  Support Systems: Child(jermaine), Assisted living  Patient Expects to be Discharged to[de-identified] Skilled nursing facility  Current DME Used/Available at Home: Walker, rolling  Tub or Shower Type: Shower  Prior Level of Function/Work/Activity:  Was ambulating with walker and performing ADLS prior to fall in memory care unit      Number of Personal Factors/Comorbidities that affect the Plan of Care: Brief history (0):  LOW COMPLEXITY   ASSESSMENT OF OCCUPATIONAL PERFORMANCE[de-identified]   Activities of Daily Living:           Basic ADLs (From Assessment) Complex ADLs (From Assessment)   Basic ADL  Feeding: Total assistance  Oral Facial Hygiene/Grooming: Total assistance  Bathing: Total assistance  Upper Body Dressing: Total assistance  Lower Body Dressing: Total assistance  Toileting: Total assistance (catheter and brief in place)     Grooming/Bathing/Dressing Activities of Daily Living     Cognitive Retraining  Safety/Judgement: Fall prevention               Upper Body [address] Gown: Maximum assistance Functional Transfers  Bathroom Mobility:  (x 2)   Lower Body Dressing Assistance  Socks: Total assistance (dependent) Bed/Mat Mobility  Supine to Sit: Maximum assistance; Total assistance  Sit to Stand: Minimum assistance;Assist x2  Bed to Chair: Minimum assistance;Assist x2          Most Recent Physical Functioning:   Gross Assessment:                  Posture:  Posture Assessment: Forward head, Rounded shoulders  Balance:  Sitting: Impaired;High guard; With support; Without support  Sitting - Static: Fair (occasional)  Sitting - Dynamic: Fair (occasional) (intermittent support once stabalized on EOB)  Standing: Impaired;Pull to stand; With support  Standing - Static: Constant support;Fair;Poor  Standing - Dynamic : Poor (posterior lean) Bed Mobility:  Supine to Sit: Maximum assistance; Total assistance  Wheelchair Mobility:     Transfers:  Sit to Stand: Minimum assistance;Assist x2  Bed to Chair: Minimum assistance;Assist x2                    Patient Vitals for the past 6 hrs:   BP BP Patient Position SpO2 Pulse   05/26/17 0810 113/54 At rest 90 % 76   05/26/17 1055 131/73 At rest 91 % 94        Mental Status  Neurologic State: Alert, Confused  Orientation Level:  (followed directions with extra time and cues)  Cognition: Decreased attention/concentration, Follows commands  Perception: Cues to maintain midline in standing (posterior lean)  Perseveration: No perseveration noted  Safety/Judgement: Fall prevention                               Physical Skills Involved:  1. Balance  2. Mobility  3. Strength  4. Endurance Cognitive Skills Affected (resulting in the inability to perform in a timely and safe manner):  1. Understanding  2. Problem Solving  3. Mental Sequencing  4. Remembering Psychosocial Skills Affected:  1. Interpersonal Interactions  2. Routines and Behaviors  3. Active Use of Coping Strategies  4. Environmental Adaptations   Number of elements that affect the Plan of Care: 5+:  HIGH COMPLEXITY   CLINICAL DECISION MAKIN [facility] Mobility Inpatient Short Form  How much help from another person does the patient currently need. .. Total A Lot A Little None   1. Putting on and taking off regular lower body clothing? [X] 1   [ ] 2   [ ] 3   [ ] 4   2. Bathing (including washing, rinsing, drying)? [X] 1   [ ] 2   [ ] 3   [ ] 4   3. Toileting, which includes using toilet, bedpan or urinal?   [X] 1   [ ] 2   [ ] 3   [ ] 4   4. Putting on and taking off regular upper body clothing? [X] 1   [ ] 2   [ ] 3   [ ] 4   5. Taking care of personal grooming such as brushing teeth? [X] 1   [ ] 2   [ ] 3   [ ] 4   6. Eating meals? [X] 1   [ ] 2   [ ] 3   [ ] 4   © , Trustees of [address] [place] [address], under license to [employer]. All rights reserved    Score:  Initial: 6 Most Recent: X (Date: -- )     Interpretation of Tool:  Represents activities that are increasingly more difficult (i.e. Bed mobility, Transfers, Gait).        Score 24 23 22-20 19-15 14-10 9-7 6       Modifier CH CI CJ CK CL CM CN         · Self Care:               - CURRENT STATUS:    CN - 100% impaired, limited or restricted               - GOAL STATUS:           CJ - 20%-39% impaired, limited or restricted               - D/C STATUS:                       ---------------To be determined---------------  [place] / [place] / Product Type: Medicare /       Medical Necessity:     · Patient is expected to demonstrate progress in strength, balance, coordination and functional technique to decrease assistance required with self care and functional mobility. Reason for Services/Other Comments:  · Patient continues to require skilled intervention due to decreased self care and functional mobility. Use of outcome tool(s) and clinical judgement create a POC that gives a: MODERATE COMPLEXITY             TREATMENT:   (In addition to Assessment/Re-Assessment sessions the following treatments were rendered)      Pre-treatment Symptoms/Complaints:  None resting   Pain: Initial:   Pain Intensity 1: 0  Post Session:  0/10 at rest      Assessment/Reassessment only, no treatment provided today     Braces/Orthotics/Lines/Etc:   · pino catheter  · O2 Device: Room air  Treatment/Session Assessment:  Cooperative and moved with min assist x 2. Trying to make conversation. He was reported he cant see and he is very hard of hearing. · Response to Treatment:    · Interdisciplinary Collaboration:  · Physical Therapist, Occupational Therapist, Registered Nurse and Certified Nursing Assistant/Patient Care Technician  · After treatment position/precautions:  · Up in chair, Bed alarm/tab alert on, Bed/Chair-wheels locked, Bed in low position, Call light within reach and RN notified  · Compliance with Program/Exercises: compliant most of the time. · Recommendations/Intent for next treatment session: \"Next visit will focus on advancements to more challenging activities and reduction in assistance provided for self care and functional mobility\".   Total Treatment Duration:11  OT Patient Time In/Time Out  Time In: 1055  Time Out: [address], OT

## 2017-05-26 NOTE — PROGRESS NOTES
Physical therapy note: pt doing better today than yesterday but pt was only able to tolerate one therapy intervention this morning. Physical therapy was present with Occupational therapist today and helped provide assistance as needed but no PT interventions done. Pt did well with OT this morning, hope he will continue to progress and will work with pt later today or tomorrow. Thanks.  Alexsander Tony, PT

## 2017-05-26 NOTE — HOSPICE
Patient evaluated for hospice care. Long discussion with daughter over the phone as she is at work today. She understands the hospice philosophy and services but would like to think about her decision over night to ensure she is making the right one. She feels that comfort measures are in order but needs a little more time. Case was discussed with Dr. Maryann Parish and patient is approved for [facility] when the daughter agrees.   Thank you for this referral.  Sherie Reyes RN  [place] Nurse Liaison  [facility]   [phone]

## 2017-05-26 NOTE — PROGRESS NOTES
Problem: Interdisciplinary Rounds  Goal: Interdisciplinary Rounds  Interdisciplinary team rounds were held 5/26/2017 with the following team members:Care Management, Nursing, Nurse Practitioner, Nutrition, Pastoral Care, Patient Relations, Pharmacy and Physician. Plan of care discussed. See clinical pathway and/or care plan for interventions and desired outcomes.

## 2017-05-27 PROBLEM — G93.41 ENCEPHALOPATHY, METABOLIC: Status: ACTIVE | Noted: 2017-01-01

## 2017-05-27 PROBLEM — F05 DELIRIUM DUE TO ANOTHER MEDICAL CONDITION, ACUTE, MIXED LEVEL OF ACTIVITY: Status: ACTIVE | Noted: 2017-01-01

## 2017-05-27 NOTE — PROGRESS NOTES
OT attempted to see pt in am. Pt is getting transported to hospice.    Thank you   FERNIE Mora/AUBREE

## 2017-05-27 NOTE — PROGRESS NOTES
Pt is alert but confused. Sleeps a lot. Has a pino. Skin frail warm and dry. Safety measure in place.

## 2017-05-27 NOTE — HOSPICE
[facility]:  Patient approved for general in patient care at [facility]. DNR signed and left for physician to sign. Pt to transport this afternoon, MSW to arrange.      Thank you,  Zaid Santiago RN   [phone]

## 2017-05-27 NOTE — PROGRESS NOTES
Consulted with MD per pt care and doctor advised against PT due to pt being transferred to Hospice today.     Vanetta Dakins, PT, DPT

## 2017-05-27 NOTE — PROGRESS NOTES
Staff called about patient who is going to hospice soon. Family has requested a  to visit. Upon review of chart it may be best for [facility]  to follow request due to pending discharge. Will attempt to visit patient if he is on campus asap.   Signed by chaplain Marcos

## 2017-05-27 NOTE — PROGRESS NOTES
Pt attempting to get out of bed. Nurse at bedside charting and speaking with pt. Pt pulling of pino. When explaining the purpose of pino pt did not appear to understand. Tab alerts placed on pt bilaterally.  Will continue to monitor

## 2017-05-27 NOTE — H&P
History and Physical    Patient: Ingris Cruz MRN: [id number]  SSN: [id number]    YOB: 1917  Age: 80 y.o. Sex: male      Subjective: Ingris Cruz is a 80 y.o. male who    was residing at [facility] at [address]. In the past weeks he has had several falls. In the past few days he has developed a profound alteration of mental state, was brought to hospital for evaluation, which disclosed a new L5 compression fracture, atrial fibrillation with RVR, and acute renal injury with BUN 46, creatinine 2.2 mg/dL. Urinalysis is consistent with UTI. Throughout four days in hospital he has exhibited restlessness with tendency to climb over his bedrails and nearly continuous babbling nonsensical speech. He has been refusing solid food and medication but will intake frequent sips of milkshake and ensure. No specific infectious ideology has been demonstrated but the patient continues to rigors and leukocytosis, despite broad spectrum antibiotic therapy. .            Primary diagnoses: Metabolic enceplphalopathy     Secondayr:     UTI     Acute renal failure     L5 compression fracture     Atrial fibrillation with RVR     Macrocytic anemia               .     Past Medical History:   Diagnosis Date    Arthritis     Hypertension     Other unknown and unspecified cause of morbidity or mortality     a-fib     Past Surgical History:   Procedure Laterality Date    HX UROLOGICAL      prostate      No family history on file. Social History   Substance Use Topics    Smoking status: Never Smoker    Smokeless tobacco: Not on file    Alcohol use No      Prior to Admission medications    Medication Sig Start Date End Date Taking? Authorizing Provider   cefpodoxime (VANTIN) 200 mg tablet Take 1 Tab by mouth two (2) times a day. 5/27/17   Benny Hwang NP   mirtazapine (REMERON) 15 mg tablet Take 15 mg by mouth nightly.     Historical Provider   bisacodyl (DULCOLAX) 5 mg EC tablet Take 5 mg by mouth daily as needed for Constipation. Ines Souza MD   calcipotriene (DOVONOX) 0.005 % ointment Apply  to affected area two (2) times a day. Ines Souza MD   ferrous gluconate 324 mg (38 mg iron) tablet Take 324 mg by mouth Daily (before breakfast). Ines Souza MD   ranitidine (ZANTAC) 150 mg tablet Take 150 mg by mouth two (2) times a day. Ines Souza MD   cyanocobalamin 1,000 mcg tablet Take 1,000 mcg by mouth daily. Ines Souza MD   cholecalciferol (VITAMIN D3) 1,000 unit cap Take 1,000 Units by mouth daily. Ines Souza MD   sennosides 8.6 mg cap Take 1 Cap by mouth. Ines Souza MD   acetaminophen (TYLENOL) 325 mg tablet Take 500 mg by mouth every four (4) hours as needed for Pain. Ines Souza MD   DIGOX 125 mcg tablet TAKE 1 TABLET BY MOUTH ONCE DAILY. 10/7/15   Terri North NP   DILT- mg XR capsule TAKE 1 CAPSULE BY MOUTH ONCE DAILY. 10/7/15   Terri North NP   furosemide (LASIX) 40 mg tablet TAKE 1 TABLET BY MOUTH ONCE DAILY. 10/7/15   Terri North NP   polyethylene glycol (MIRALAX) 17 gram packet Take 17 g by mouth daily. Ines Souza MD   POTASSIUM CHLORIDE SR 10 MEQ TAB 10 mEq daily. Ines Souza MD   loperamide (IMODIUM) 2 mg capsule Take 2 mg by mouth. Ines Souza MD        No Known Allergies    Review of Systems:  Review of systems not obtained due to patient factors. Objective:     Vitals:    05/27/17 1657   BP: 98/63   Pulse: 95   Resp: 19   Temp: 97.5 °F (36.4 °C)        Physical Exam:findings and elderly white man with multiple  Facial and scalp excisions for presumed skin cancer. .  He has numerous actinic keratoses on the face and arms. His mental state is marked by perseveration and delusional thought. He is restless and seeking to modify the position of his Bunch catheter. Lung fields are clear. Heart tones are rapid and irregular with a soft systolic murmur. Extremities show no edema.   There are no decubitus ulcers. Assessment:   The patient's life is threatened by delirium/metabolic encephalopathy. We will use small doses of morphine for controlling the pain of his recent vertebral fracture. He has received empiric antibiotics at the hospital over the last 4 days and these have been stopped. We'll await the emergence of occult infection. We will use minimal doses of Haldol for agitation subcutaneous. We will continue to test his ability to ingest food and fluids or medications safely. Plan:     No current facility-administered medications for this encounter. His family has chosen at this time to stop life extending care including antibiotics, IV hydration, and cardiotropic medications. They seek to limit further care to comfort measures only. He continues to require parenteral opioid for acute fracture pain and parenteral psychotropic medication for delirium.  The present clinical picture predicts his demise will occur within two weeks    Signed By: Ilia Luis MD     May 27, 2017

## 2017-05-27 NOTE — PROGRESS NOTES
The patient is removing his gown and saying, \" Can't you see my brother over there waiting on me? \"   Placed cath secure to upper right arm and attached tab alert to it. Administered Morphine 2 mg SQ for dyspnea and for pain. Administered Haldol 2 mg SQ for agitation. Reassured the patient that he is not alone. Reported to the primary RN what had been given.

## 2017-05-27 NOTE — PROGRESS NOTES
Walking rounds done with report. Pt identified by name/. Two cna's in with pt. Very agitated,very hard to redirect. Upon this RN's arrival into room pt had pulled off stat lock securing pino and was firmly pulling trying to remove pino. Very difficult to redirect. Pt trying to exit bed. Requiring one on one at this time to maintain safety. Grimacing,unable to convey where pain is at this time. See MAR for med intervention. Stimuli in room decreased as much as possible. Bed in low and locked position with side rails up x2 and bilateral tab alarms in place with battery check performed and volume set to high. Spoke with Baudilio Perry at this time and together  safety guidelines ensured.

## 2017-05-27 NOTE — PROGRESS NOTES
Alert to self with confusion. Lungs diminished. Has odette. # 22 to rt arm. Not eating or drinking very much. Does not appear to be in any pain. Safety measures in place. Continue to monitor.

## 2017-05-27 NOTE — DISCHARGE SUMMARY
Hospitalist Discharge Summary   Patient ID:  Ingris Cruz  [id number]  89 y.o.  3/12/1917  Admit date: 5/23/2017 11:15 AM  Discharge date and time: 5/27/2017  Attending: Oly Kenny MD  PCP:  Josie Gaucher, MD  Treatment Team: Attending Provider: Oly Kenny MD; Utilization Review: Rusty Alanis  Principal Diagnosis JODI (acute kidney injury) [facility])   Principal Problem:    JODI (acute kidney injury) ([facility] [address] (5/23/2017)    Active Problems:    Memory loss (5/23/2017)      Lumbar compression fracture ([address] (5/23/2017)      Leukocytosis (5/23/2017)      A-fib ([address] (1/38/9766)      Metabolic encephalopathy (1/72/3661)      Hearing loss (5/23/2017)      Anemia (5/23/2017)      Hyperglycemia (5/23/2017)       * Admission Diagnoses: JODI (acute kidney injury) ([facility] [address]  * Discharge Diagnoses:    Hospital Problems as of 5/27/2017  Never Reviewed          Codes Class Noted - Resolved POA    * (Principal)JODI (acute kidney injury) ([facility] [address] [address] ICD-10-CM: N17.9  ICD-9-CM: 584.9  5/23/2017 - Present Yes        Memory loss (Chronic) ICD-10-CM: R41.3  ICD-9-CM: 780.93  5/23/2017 - Present Yes        Lumbar compression fracture ([facility] ICD-10-CM: S32.000A  ICD-9-CM: 805.4  5/23/2017 - Present Yes        Leukocytosis ICD-10-CM: R46.116  ICD-9-CM: 288.60  5/23/2017 - Present Yes        A-fib ([facility] [address] (Chronic) ICD-10-CM: I48.91  ICD-9-CM: 427.31  5/23/2017 - Present Yes        Metabolic encephalopathy JBZ-98-JS: G93.41  ICD-9-CM: 348.31  5/23/2017 - Present Yes        Hearing loss (Chronic) ICD-10-CM: H91.90  ICD-9-CM: 389.9  5/23/2017 - Present Yes        Anemia (Chronic) ICD-10-CM: D64.9  ICD-9-CM: 285.9  5/23/2017 - Present Yes        Hyperglycemia ICD-10-CM: R73.9  ICD-9-CM: 790.29  5/23/2017 - Present Yes                Hospital Course:    Mr. Joseph Holman is a 81 yo male who presented with increasing confusion. CT showing chronic changes.  PT also sustained fall, xray L-spine showing compression fx L1,2,5 however age indeterminate. UA showing UTI. Urine cx NGTD. CXR without acute findings. Pt to [facility] today. Diagnostic Study/Procedure results summary copied from within [facility] EMR:  CT Brain dated 5/23/2017      Comparison: 7/24/2016     Clinical Information: Fall last night. Altered mental status         5 mm axial images were obtained from skull base to vertex without contrast.     Radiation dose reduction techniques were used for this study. Our scanners use  one or all of the following: Automated exposure control, adjustment of the mA  and/or kV according to patient size, iterative reconstruction.        Findings:     There is atrophy. No midline shift. No hemorrhage, mass, mass effect or acute  territorial infarction. Bilateral chronic subdural hygromas are present and  unchanged in size. There is no skull fracture. Mastoid air cells and visualized  paranasal sinuses are aerated and unremarkable.      IMPRESSION  Impression:     Chronic changes. No acute abnormality      Pelvis one view dated 5/23/2017     CLINICAL INFORMATION: Fall last night. Back pain     Bones are osteopenic. There are severe degenerative changes right hip and  moderate degenerative changes left hip. No acute fracture or dislocation is  seen.     IMPRESSION  IMPRESSION: No acute bony abnormality      Lumbar spine 3 views dated 5/23/2017     CLINICAL INFORMATION: Fall last night. Pain     No comparison           Bones are osteopenic. There is moderate compression of the L1 and L2 vertebral  bodies and mild compression of L5 of uncertain age. Degenerative changes are  present throughout the lumbar spine.           IMPRESSION  IMPRESSION: Moderate lumbar compression        Portable AP semiupright chest dated 5/23/2017 at 1420 hours     CLINICAL INFORMATION: Leukocytosis     Heart is normal in size. Thoracic aorta is calcified and mildly tortuous. Vascularity appears normal and the lungs. Lungs are clear.  No pleural effusion.     IMPRESSION  IMPRESSION: No acute abnormality    Labs: Results:       Chemistry Recent Labs      05/26/17   0548  05/25/17   0530   GLU  171*  212*   NA  147*  144   K  4.3  4.8   CL  109*  105   CO2  20*  18*   BUN  46*  37*   CREA  2.18*  2.29*   CA  9.0  9.7   AGAP  18*  21*      CBC w/Diff Recent Labs      05/26/17   0548  05/25/17   0530   WBC  12.0*  13.0*   RBC  3.00*  2.76*   HGB  10.1*  9.4*   HCT  30.9*  28.7*   PLT  335  287   GRANS  78   --    LYMPH  9*   --    EOS  0*   --       Cardiac Enzymes No results for input(s): CPK, CKND1, LIAN in the last 72 hours. No lab exists for component: CKRMB, TROIP   Coagulation No results for input(s): PTP, INR, APTT in the last 72 hours. No lab exists for component: INREXT    Lipid Panel @BRIEFLAB(CHOL,CHOLPOCT,393227,912678,[id number],CHOLX,CHOLP,CHLST,CHOLV,029607,HDL,HDLPOC,HDLPOCT,747181,NHDLCT,[id number],HDLC,HDLP,LDL,LDLPOCT,LDLCPOC,921546,NLDLCT,DLDL,LDLC,DLDLP,706191,VLDLC,VLDL,TGL,TGLX,TRIGL,[id number],TRIGP,TGLPOCT,459612,847911,CHHD,CHHDX)@   BNP No results for input(s): BNPP in the last 72 hours. Liver Enzymes No results for input(s): TP, ALB, TBIL, AP, SGOT, GPT in the last 72 hours. No lab exists for component: DBIL   Thyroid Studies No results found for: T4, T3U, TSH, TSHEXT         Discharge Exam:  Visit Vitals    /73 (BP 1 Location: Right arm, BP Patient Position: At rest)    Pulse 80    Temp 96.1 °F (35.6 °C)    Resp 18    Ht 5' 1\" (1.549 m)    Wt 59 kg (130 lb)    SpO2 96%    BMI 24.56 kg/m2       General: No acute distress, confused    Lungs: CTA bilaterally. Resp even and nonlabored  Heart:  S1S2 present without murmurs rubs gallops. RRR. No edema  Abdomen: Soft, non tender, non distended. BS present  Extremities: Moves ext spontaneously.  No cyanosis  Neurologic:  Confused, does not follow commands    Disposition: Hospice  Discharge Condition: stable  Patient Instructions:   Current Discharge Medication List START taking these medications    Details   cefpodoxime (VANTIN) 200 mg tablet Take 1 Tab by mouth two (2) times a day. Qty: 2 Tab, Refills: 0         CONTINUE these medications which have NOT CHANGED    Details   mirtazapine (REMERON) 15 mg tablet Take 15 mg by mouth nightly. bisacodyl (DULCOLAX) 5 mg EC tablet Take 5 mg by mouth daily as needed for Constipation. calcipotriene (DOVONOX) 0.005 % ointment Apply  to affected area two (2) times a day. ferrous gluconate 324 mg (38 mg iron) tablet Take 324 mg by mouth Daily (before breakfast). ranitidine (ZANTAC) 150 mg tablet Take 150 mg by mouth two (2) times a day. cyanocobalamin 1,000 mcg tablet Take 1,000 mcg by mouth daily. cholecalciferol (VITAMIN D3) 1,000 unit cap Take 1,000 Units by mouth daily. acetaminophen (TYLENOL) 325 mg tablet Take 500 mg by mouth every four (4) hours as needed for Pain. DIGOX 125 mcg tablet TAKE 1 TABLET BY MOUTH ONCE DAILY. Qty: 30 Tab, Refills: 11      DILT- mg XR capsule TAKE 1 CAPSULE BY MOUTH ONCE DAILY. Qty: 30 Cap, Refills: 11      furosemide (LASIX) 40 mg tablet TAKE 1 TABLET BY MOUTH ONCE DAILY. Qty: 30 Tab, Refills: 11      polyethylene glycol (MIRALAX) 17 gram packet Take 17 g by mouth daily. POTASSIUM CHLORIDE SR 10 MEQ TAB 10 mEq daily. sennosides 8.6 mg cap Take 1 Cap by mouth. loperamide (IMODIUM) 2 mg capsule Take 2 mg by mouth.          STOP taking these medications       aspirin delayed-release 325 mg tablet Comments:   Reason for Stopping:               Activity: Activity as tolerated  Diet: Comfort feeding  Wound Care: None needed      DNR   Surrogate decision maker: Daughter  Pneumonia and flu vaccine to be administered at discharge per hospital protocol   Follow-up  ·   Annabella Delacruz today  ·   DC to complete Vantin course    Notes, labs, VS, diagnostic testing reviewed  Case discussed with care team, Dr. Mary Ferrer      Time spent to discharge patient 39 min  Signed:  Matt Butts NP  5/27/2017  10:00 AM

## 2017-05-27 NOTE — PROGRESS NOTES
Shift assessment completed via flowsheet. Pt A/O to person only with confusion noted. Able to voice needs. Respiration even and unlabored on room air and denies of SOB. Lungs sounds clear bilaterally. HR irregular with S1, S2 noted on auscultation. Abd soft with active BS on all four quadrants. Skin tear to LFA and wound to L leg c/d/i dressing. Allyvin to sacrum/coccyx for protection, c/d/i. Bunch patent and draining yellow/straw UOP. Scattered bruises noted to both upper and lower extremities. Pt incont for B&B and wears brief. Pt has a green soft BM, Brief change and is clean/dry. IVF infusing without difficulties. SCD in place and functioning well. Pt currently in bed, denies of any needs or pain at this time, bed low locked position, bed alarm on and functioning, side rails up x3, call light within reach and instructed to call for help.

## 2017-05-27 NOTE — PROGRESS NOTES
Pt arrived via EMS, pt very hard of hearing and not able to answer the question about name and date of birth, began to talk about his Torito Brooms was in March, and asking if someone else had a birthday. Pt alert but confused, he has a reddened unblanchable sacral area, reddened boggy heels, as well as a healed skin tear to left forearm and extensive bruising to bilateral arms. Pt nose has a reddened area that daughter states is from skin cancer removal . Pt daughter, [facility]  and daughter in law , Popeye Tru at bedside. [facility] states her mother was in hospice and she took care of her so she is familiar with hospice. States her father did not want any extraordinary measures and she wants to support his wishes. States she just wants him to be comfortable. Pt was unable to answer question about pain and started talking about an unrelated subject. Discussed with daughter, DNR, plan of care, prognosis and pt's wishes.

## 2017-05-27 NOTE — PROGRESS NOTES
Dr Kellie Dickinson notified of pt screaming, grunting, attempting to get out of bed, new order received for one time dose of Dilaudid 1mg and put in place.

## 2017-05-28 NOTE — PROGRESS NOTES
Yelling, pointing to and waving at things I cannot see. He is reassured, repositioned but continues to yell out. At times he grimaces quite significantly but does not answer questions regarding pain. Family presents, they feel he recognizes them. Medicated. Family somewhat distressed at his anxiety, but reassured.

## 2017-05-28 NOTE — PROGRESS NOTES
Cna has been sitting with pt to ensure safety. Brief placed on to prevent easy access to catheter. Once repositioned up in bed pt is finally calmed down. Facial grimacing has eased. All safety measures continue. Door left open as pt is unaccompanied and will provide continuous monitoring.

## 2017-05-28 NOTE — PROGRESS NOTES
Restful. FLACC 0.  RR 16, easy. Quiet. Unaccompanied. Skin warm, dry. He does not respond to his name.

## 2017-05-28 NOTE — PROGRESS NOTES
ID, bedside report rec'd. He is restless, yelling out. Medicated. He does not follow commands, acknowledge that I am speaking to him. Repositioned.

## 2017-05-28 NOTE — PROGRESS NOTES
Family exits without further questions/comments. Pt is restless, requires staff to sit at the bedside as he will not leave his legs in the bed. Skin cool, dry. Despite reassurance, quieting the environment and instruction, he continuously attempts to get his legs out of the bed.

## 2017-05-28 NOTE — PROGRESS NOTES
He continues to attempt to get his legs out of bed despite frequent presence and instructions from staff. He does not follow commands, does not focus on speaker.

## 2017-05-28 NOTE — PROGRESS NOTES
Agitated. Responding to stimuli unseen. Pointing to ceiling,fearful. Lights turned on,emotional support provided. Quite difficult to redirect. Pt is [place],his speech difficult to understand. See MAR. No longer moaning or groaning with pain. CNA in with pt as well to help reposition and comfort pt. All safety measures continues.

## 2017-05-28 NOTE — PROGRESS NOTES
Yelling \"ow ow\". At this time he does not have his pino in his hand, is repositioned but continues to yell. Medicated. RR 20. Unaccompanied.

## 2017-05-28 NOTE — PROGRESS NOTES
Yelling, pulling on his pino despite instructions clearly and loudly to leave it alone. He does not follow commands. He does answer some questions, yes/no at times but inconsistent. Repositioned, medicated and reassured. Assessment, ESAS, fall risk and montrell completed. He does not assist or anticipate. All considerations for level III fall risk initiated/continue. SRs up x 2. Call bell within reach. x__Assess for environmental safety  _x_Ensure frequently used items are in reach of the patient  _x_Place bed in lowest position with wheel locked  __Check footwear for slip resistance and proper fit  _x_Assess effect to patient with medication changes, diuretic and narcotic use  x__Assess need for equipment: bedside commode, urinal, bedpan, etc.  x__Assess and managed pain and symptoms  _x_Assess patients cognition and ability to follow instructions. [place] patient to surroundings.   x__Educate patient and family related to safety techniques  _x_Provide lighting at all times  __Use floor mats while patient in bed  x__Use tab or bed alarm at all times  _x_Position patient for comfort  _x_Assess fall trends  x__Assess for need of physical therapy  _x_Assess need of volunteer or family to sit with patient  x__Evaluate need for change in medications and other treatments  x__Encourage family to provide close supervision while transferring, ambulation, toileting, etc and to request assistance as needed  _x_Assess the need for patient to be close to nurses station  _x_Leave patient door open while unattended by family or staff  _x_Educate patient / family of no restraint policy and interventions to promote safety

## 2017-05-29 NOTE — [facility] IDG VOLUNTEER NOTES
[address] Review     Status Codes I = Initiated C=Continued R=Revised RS = Resolved     I.  Volunteer     Goal: Hospice house volunteer (s) enhances the quality of remaining life while patient is at the hospice house. Interventions: Britton Velázquez Child Volunteer (s) will provide companionship to the patient and/or family by visiting at the hospice house       . Britton Velázquez Vincent Volunteer (s) will provide respite as needed when requested by patient and/or family. Britton Castañeda  Volunteer will provide activities such as music, reading, pet therapy, etc. as requested. Britton Castañeda  Comfort bag delivered. Any other special requests or information regarding volunteer services:    Comfort Bag delivered. Staff have asked for companionship visits to the family and volunteers have been notified. No further needs identified at this time. These notes have been discussed in [address] meeting.           Signed by: Albertina Chisholm

## 2017-05-29 NOTE — PROGRESS NOTES
Pt identified by name/. Pt very agitated,pulling at pino,tab alarms sounding. Pt is quite hard of hearing,doesn't follow directions despite volume of this writers commands. Repositioned in bed. Attempt to cover pino and stat lock securing device with brief as a distraction. Pt moaning,grimacing and is winded from all the non purposeful movements. Oxygen pulled tightly in his hands,replaced and continues as orders. Clearly pt is responding to unseen stimuli in room by pointing/calling out at the ceiling. Emotional support and companionship provided. Bilateral tab alarms checked for battery and volume. Settings placed on high with both tabs now attached securely  to his garment out of his sight. Bed in low and locked position with side rails up x2. Door left open as pt is unaccompanied by family and for ease of frequent monitoring.

## 2017-05-29 NOTE — PROGRESS NOTES
Progress Note    Patient: Rajan Walker MRN: [id number]  SSN: [id number]    YOB: 1917  Age: 80 y.o. Sex: male      Admit Date: 5/27/2017    LOS: 2 days     Subjective:     Unresponsive. NPO since 5/27. Has required 8 doses of Morphine for pain and dyspnea, Ativan x 3 SL and Haldol x 5 SQ for agitation. Family at bedside. Review of Systems:  Review of systems not obtained due to patient factors. Objective:     Vitals:    05/29/17 0801 05/29/17 0831 05/29/17 0904 05/29/17 1008   BP:       Pulse:       Resp: 26 26 18 18   Temp:            Intake and Output:  Current Shift: 05/29 0701 - 05/29 1900  In: 0   Out: 325 [Urine:325]  Last three shifts: 05/27 1901 - 05/29 0700  In: 0   Out: 375 [Urine:375]    Physical Exam:   GENERAL: Unresponsive, mild distress, appears stated age  LUNG: Coarse breath sounds with labored respirations. HEART: regular rate and rhythm, S1, S2 normal, no murmur, click, rub or gallop  ABDOMEN: soft, non-tender. Bowel sounds hypoactive. No masses,  no organomegaly  : Bunch catheter with dark yellow urine. EXTREMITIES:  extremities normal, atraumatic, no cyanosis or edema. SKIN: Warm to touch. Healing skin tear on left arm with steri-strips in place. NEUROLOGIC: Unresponsive. Bedbound. Unable to follow commands. Lab/Data Review:  No new labs resulted in the last 24 hours.     Assessment:     Active Problems:    Delirium due to another medical condition, acute, mixed level of activity (5/27/2017)      Encephalopathy, metabolic (2/71/2581)        Plan:     Current Facility-Administered Medications   Medication Dose Route Frequency    LORazepam (ATIVAN) tablet 1 mg  1 mg SubLINGual Q2H PRN    LORazepam (ATIVAN) injection 1 mg  1 mg IntraMUSCular Q2H PRN    morphine injection 2 mg  2 mg SubCUTAneous Q20MIN PRN    acetaminophen (TYLENOL) suppository 650 mg  650 mg Rectal Q3H PRN    bisacodyl (DULCOLAX) suppository 10 mg  10 mg Rectal PRN    haloperidol lactate (HALDOL) injection 2 mg  2 mg SubCUTAneous Q1H PRN    haloperidol lactate (HALDOL) injection 2 mg  2 mg SubCUTAneous Q1H PRN     Admitted with metabolic encephalopathy for management of pain and agitation. 1. Pain: Morphine as ordered. 2. Agitation: Haloperidol and Lorazepam as ordered. 3. Family/Pt Support: Family at bedside during exam. Medications and plan of care discussed with nursing staff and family. Will continue to monitor for symptoms and adjust medications as needed to maintain patient comfort. PPS 10%. Case discussed with Dr. Catracho Jernigan and in [facility] [place], operated by [facility]. Hold tray.      Signed By: Lisette Nissen, NP     May 29, 2017

## 2017-05-29 NOTE — PROGRESS NOTES
Pt resting with eyes closed. Respirations easy and unlabored. No distress,discomfort,anxiety or nausea noted. Bed in low and locked position with side rails up x2 and call light in reach. Tab alarms in place. Door remains open.

## 2017-05-29 NOTE — PROGRESS NOTES
Summary Note- Responsive to pain only. Patient required SQ PRN medications for agitation/dyspnea/pain. Mouth care only. Bunch to gravity; no bowel movement this shift. Wound care provided per order.  contacted by Mary Carmen Seen and scheduled to be at hospice house between 9:00-9:30 tomorrow morning. Patient safety maintained through hourly rounding: bed low and locked, side rails x2, tab alerts on, call light within reach, and door open for continuous monitoring.

## 2017-05-29 NOTE — PROGRESS NOTES
Received report from night shift RN. Patient sleeping. Identified by name and date of birth on armband. No s/sx pain, agitation, dyspnea, or N/V. Bed low and locked, side rails x2, tab alerts on, call light within reach, and door open for continuous monitoring.

## 2017-05-29 NOTE — [facility] IDG SOCIAL WORKER NOTES
Patient: Ingris Cruz    Date: 05/29/17  Time: 12:04 PM    [facility]  Notes  LMSW will complete the initial SW assessment. Pt is a fall risk.            Signed by: Raya [facility]

## 2017-05-29 NOTE — PROGRESS NOTES
LMSW went to complete the SW initial assessment. The pt is obtunded and there is no family present. LMSW will attempt later.

## 2017-05-30 NOTE — HSPC IDG NURSE NOTES
Patient: Juvenal     Date:05/29/2017  Time: 12:30 PM    [facility] [place] Nurse Notes    UPDATE: Patient is a 8 year old Male, with history of falls and diagnosis of Metabolic Encephalopy. GIP for pain,, agitation and delirium symptom management. Poor IV access, and meds changed to subq. Morphine for pain and dyspnea and Ativan and Haldol for Agitation. GOALS OF CARE:   Continue to monitor for optimal patient comfort and symptom management. Ongoing family support.             Signed by: Narinder Dumont

## 2017-05-30 NOTE — PROGRESS NOTES
Assumed care from AM RN. Assessed patient at this time. No family at bedside. Patient is nonresponsive to voice or stimuli, skin is dry with miscellaneous growths and lesions, mucous membranes dry as patient is breathing with an open mouth. What appears to be dried blood is scattered throughout patient's mouth. Breathing is regular but shallow with no signs of distress other than poor respiratory quality. Body is lying on left side without posturing, guarding or flexure. Trace of mottling to BLE. Patient is actively dying at this time. Will monitor closely and medicate for comfort.   Annointing of the sick to be performed at [address] [place] [address]

## 2017-05-30 NOTE — PROGRESS NOTES
Progress Note    Patient: Faith Bull MRN: [id number]  SSN: [id number]    YOB: 1917  Age: 80 y.o. Sex: male      Admit Date: 5/27/2017    LOS: 3 days     Subjective:     Unresponsive. NPO since 5/27. Has required 8 doses of Morphine for pain and dyspnea, Ativan x 3 SL and Haldol x 5 SQ for agitation. Family at bedside. Review of Systems:  Review of systems not obtained due to patient factors. Objective:     Vitals:    05/29/17 1700 05/29/17 1747 05/29/17 1911 05/30/17 0326   BP: 107/68   117/68   Pulse: 97   88   Resp: 17 24 18 16   Temp: 98.2 °F (36.8 °C)   96.5 °F (35.8 °C)        Intake and Output:  Current Shift:    Last three shifts: 05/28 1901 - 05/30 0700  In: 0   Out: 575 [Urine:575]    Physical Exam:   GENERAL: Unresponsive, mild distress, appears stated age  LUNG: Coarse, diminished breath sounds with labored respirations. HEART: regular rate and rhythm, S1, S2 normal, no murmur, click, rub or gallop  ABDOMEN: soft, non-tender. Bowel sounds hypoactive. No masses,  no organomegaly  : Bunch catheter with dark yellow urine. EXTREMITIES:  extremities normal, atraumatic, no cyanosis or edema. SKIN: Cool to touch. Healing skin tear on left arm with steri-strips in place. NEUROLOGIC: Unresponsive. Bedbound. Unable to follow commands. Lab/Data Review:  No new labs resulted in the last 24 hours.     Assessment:     Principal Problem:    Encephalopathy, metabolic (8/97/2830)    Active Problems:    Delirium due to another medical condition, acute, mixed level of activity (5/27/2017)        Plan:     Current Facility-Administered Medications   Medication Dose Route Frequency    glycopyrrolate (ROBINUL) injection 0.2 mg  0.2 mg SubCUTAneous Q4H PRN    LORazepam (ATIVAN) tablet 1 mg  1 mg SubLINGual Q2H PRN    LORazepam (ATIVAN) injection 1 mg  1 mg IntraMUSCular Q2H PRN    morphine injection 2 mg  2 mg SubCUTAneous Q20MIN PRN    acetaminophen (TYLENOL) suppository 650 mg 650 mg Rectal Q3H PRN    bisacodyl (DULCOLAX) suppository 10 mg  10 mg Rectal PRN    haloperidol lactate (HALDOL) injection 2 mg  2 mg SubCUTAneous Q1H PRN    haloperidol lactate (HALDOL) injection 2 mg  2 mg SubCUTAneous Q1H PRN     Admitted with metabolic encephalopathy for management of pain and agitation. 1. Pain: Morphine as ordered. 2. Agitation: Haloperidol and Lorazepam as ordered. 3. Family/Pt Support: Family at bedside during exam. Medications and plan of care discussed with nursing staff and family. Will continue to monitor for symptoms and adjust medications as needed to maintain patient comfort. PPS 10%. Case discussed with Dr. Supriya Reese. Last rites completed by  yesterday.     Signed By: Angie Pacheco NP     May 30, 2017

## 2017-05-30 NOTE — [facility] IDG MASTER NOTE
[facility]  Date: 05/29/2017  Time: 12:30 pm    ___________________    Patient: Alberta Rogers    ___________________    Diagnoses:  Diagnoses of Delirium due to another medical condition, acute, mixed level of activity, JODI (acute kidney injury) ([facility] Iron deficiency anemia, unspecified iron deficiency anemia type, Lumbar compression fracture, closed, initial encounter, Hyperglycemia, and Persistent atrial fibrillation ([facility] were pertinent to this visit.     Current Medications:    Current Facility-Administered Medications:     glycopyrrolate (ROBINUL) injection 0.2 mg, 0.2 mg, SubCUTAneous, Q4H PRN, Daniela Dunn NP, 0.2 mg at 05/30/17 0020    LORazepam (ATIVAN) tablet 1 mg, 1 mg, SubLINGual, Q2H PRN, Gricelda Clemons MD, 1 mg at 05/28/17 2001    LORazepam (ATIVAN) injection 1 mg, 1 mg, IntraMUSCular, Q2H PRN, Gricelda Clemons MD, 1 mg at 05/30/17 1017    morphine injection 2 mg, 2 mg, SubCUTAneous, Q20MIN PRN, 2 mg at 05/30/17 1017 **OR** [DISCONTINUED] morphine injection 2 mg, 2 mg, IntraVENous, Q20MIN PRN, Gricelda Clemons MD    acetaminophen (TYLENOL) suppository 650 mg, 650 mg, Rectal, Q3H PRN, Gricelda Clemons MD    bisacodyl (DULCOLAX) suppository 10 mg, 10 mg, Rectal, PRN, Gricelda Clemons MD    haloperidol lactate (HALDOL) injection 2 mg, 2 mg, SubCUTAneous, Q1H PRN, Gricelda Clemons MD    haloperidol lactate (HALDOL) injection 2 mg, 2 mg, SubCUTAneous, Q1H PRN, Gricelda Clemons MD, 2 mg at 05/30/17 0831    Orders:  Orders Placed This Encounter    DIET PLEASURE     Standing Status:   Standing     Number of Occurrences:   1     Order Specific Question:   Likes/Dislikes/Preferences     Answer:   hold tray    VITAL SIGNS     Standing Status:   Standing     Number of Occurrences:   1    NURSING-MISCELLANEOUS: Comfort Care Measures CONTINUOUS     Standing Status:   Standing     Number of Occurrences:   1     Order Specific Question:   Description of Order: Answer:   Comfort Care Measures    YANG CATHETER, CARE     1. Yang Catheter care every shift and PRN  2. Notify Physician of Yang Catheter leakage, occlusion, gross adherent sediment or accidental removal  3. Change Yang 30 days after insertion. Standing Status:   Standing     Number of Occurrences:   1    BLADDER CHECKS     May scan bladder PRN for urinary retention and or patient discomfort     Standing Status:   Standing     Number of Occurrences:   1    NURSING ASSESSMENT:  SPECIFY Assess for GIP, routine, or respite level of care. Q SHIFT Routine     Standing Status:   Standing     Number of Occurrences:   1     Order Specific Question:   Please describe the test or procedure you would like to order. Answer:   Assess for GIP, routine, or respite level of care.  PAIN ASSESSMENT Pain and Symptoms: Assess ever 4 hours and PRN, for GIP level of care. PRN Routine     Standing Status:   Standing     Number of Occurrences:   1     Order Specific Question:   Please describe the test or procedure you would like to order. Answer:   Pain and Symptoms: Assess ever 4 hours and PRN, for GIP level of care.  BEDREST, COMPLETE     Standing Status:   Standing     Number of Occurrences:   1    WOUND CARE, DRESSING CHANGE     Wound Care:  Location: left arm skin tear with steri-strips  Skin tear: Please leave open to air. Notify provider if wound develops drainage. Standing Status:   Standing     Number of Occurrences:   34    NURSING-MISCELLANEOUS: Admitted GIP with metabolic encephalopathy for management of pain and agitation. CONTINUOUS     Standing Status:   Standing     Number of Occurrences:   1     Order Specific Question:   Description of Order:     Answer:   Admitted GIP with metabolic encephalopathy for management of pain and agitation.     NURSING-MISCELLANEOUS: v cert This is an order to admit to in patient hospice care, for a first 90 day benefit interval, a 8 year old man who was residing at [facility] at [address]. In the past weeks he has had several falls. In the past few da. .. This is an order to admit to in patient hospice care, for a first 90 day benefit interval, a 8 year old man who was residing at [facility] at [address]. In the past weeks he has had several falls. In the past few days he has developed a profound alteration of mental state, was brought to hospital for evaluation, which disclosed a new L5 compression fracture, atrial fibrillation with RVR, and acute renal injury with BUN 46, creatinine 2.2 mg/dL. Urinalysis is consistent with UTI. Throughout four days in hospital he has exhibited restlessness with tendency to climb over his bedrails and nearly continuous babbling nonsensical speech. He has been refusing solid food and medication but will intake frequent sips of milkshake and ensure. No specific infectious ideology has been demonstrated but the patient continues to rigors and leukocytosis, despite broad spectrum antibiotic therapy. His family has chosen at this time to stop life extending care including antibiotics, IV hydration, and cardiotropic medications. They seek to limit further care to comfort measures only. He continues to require parenteral opioid for acute fracture pain and parenteral psychotropic medication for delirium. The present clinical picture predicts his demise will occur within two weeks.           Primary diagnosis: Metabolic encephalopathy    Secondary:    UTI    Acute renal failure    L5 compression fracture    Atrial fibrillation with RVR    Macrocytic anemia     Standing Status:   Standing     Number of Occurrences:   1     Order Specific Question:   Description of Order:     Answer:   v cert    DO NOT RESUSCITATE     Standing Status:   Standing     Number of Occurrences:   1    OXYGEN CANNULA Liters per minute: 2; Indications for O2 therapy: HYPOXIA PRN Routine     Standing Status:   Standing     Number of Occurrences:   1     Order Specific Question:   Liters per minute: Answer:   2     Order Specific Question:   Indications for O2 therapy     Answer:   HYPOXIA    DISCONTD: sodium chloride (NS) flush 3 mL    morphine injection 2 mg    DISCONTD: morphine injection 2 mg    DISCONTD: haloperidol lactate (HALDOL) injection 2 mg    DISCONTD: diphenhydrAMINE (BENADRYL) injection 25 mg    acetaminophen (TYLENOL) suppository 650 mg    bisacodyl (DULCOLAX) suppository 10 mg    DISCONTD: haloperidol lactate (HALDOL) injection 2 mg    haloperidol lactate (HALDOL) injection 2 mg    haloperidol lactate (HALDOL) injection 2 mg    LORazepam (ATIVAN) tablet 1 mg    LORazepam (ATIVAN) injection 1 mg    glycopyrrolate (ROBINUL) injection 0.2 mg    INITIAL PHYSICIAN ORDER: HOSPICE Level Of Care: General; Reason for Admission: delirium and fracture maryam     Standing Status:   Standing     Number of Occurrences:   1     Order Specific Question:   Status     Answer:   Hospice     Order Specific Question:   Level Of Care     Answer:   General     Order Specific Question:   Reason for Admission     Answer:   delirium and fracture maryam     Order Specific Question:   Inpatient Hospitalization Certified Necessary for the Following Reasons     Answer:   3.  Patient receiving treatment that can only be provided in an inpatient setting (further clarification in H&P documentation)     Order Specific Question:   Admitting Diagnosis     Answer:   Delirium due to another medical condition, acute, mixed level of activity [8950700]     Order Specific Question:   Terminal Prognosis Diagnosis(es)     Answer:   Vertebral compression fracture, initial encounter [facility]) [[id number]]     Order Specific Question:   Terminal Prognosis Diagnosis(es)     Answer:   UTI (urinary tract infection) [785573]     Order Specific Question:   Terminal Prognosis Diagnosis(es)     Answer:   Macrocytic anemia [552628]     Order Specific Question:   Admitting Physician     Answer: Alyce Estrada [[id number]]     Order Specific Question:   Attending Physician     Answer:   Alyce Estrada [[id number]]     Order Specific Question:   Discharge Plan:     Answer:   Home with Home Hospice    INITIAL PHYSICIAN ORDER: HOSPICE Level Of Care: General; Reason for Admission: delirium, fracture pain     Standing Status:   Standing     Number of Occurrences:   1     Order Specific Question:   Status     Answer:   Hospice     Order Specific Question:   Level Of Care     Answer:   General     Order Specific Question:   Reason for Admission     Answer:   delirium, fracture pain     Order Specific Question:   Inpatient Hospitalization Certified Necessary for the Following Reasons     Answer:   3. Patient receiving treatment that can only be provided in an inpatient setting (further clarification in H&P documentation)     Order Specific Question:   Admitting Diagnosis     Answer:   Encephalopathy, metabolic [323300]     Order Specific Question:   Terminal Prognosis Diagnosis(es)     Answer:   UTI (urinary tract infection) [338701]     Order Specific Question:   Terminal Prognosis Diagnosis(es)     Answer:   Fracture, lumbar vertebra, compression [facility]) [[id number]]     Order Specific Question:   Terminal Prognosis Diagnosis(es)     Answer:   Bone pain [030248]     Order Specific Question:   Terminal Prognosis Diagnosis(es)     Answer:   Hyperglycemia due to type 2 diabetes mellitus ([facility] [0923546]     Order Specific Question:   Terminal Prognosis Diagnosis(es)     Answer:   Acute renal failure [facility]) [227618]     Order Specific Question:   Admitting Physician     Answer:   Alyce Estrada [[id number]]     Order Specific Question:   Attending Physician     Answer:   Rocky French     Order Specific Question:   Discharge Plan:     Answer:    Other (Specify)       Allergies:  No Known Allergies    ___________________    Care Team Notes          POC/IDG Notes      [facility] by Yaritza Franklin at 05/30/17 8835 Version 1 of 1    Author:  Narinder Dumont Service:  HOSPICE Author Type:  Registered Nurse    Filed:  05/30/17 1350 Date of Service:  05/30/17 1347 Status:  Signed    :  Narinder Dumont (Registered Nurse)           Patient: Juvenal     Date:05/29/2017  Time: 12:30 PM    [facility] Nurse Notes    UPDATE: Patient is a 8 year old Male, with history of falls and diagnosis of Metabolic Encephalopy. GIP for pain,, agitation and delirium symptom management. Poor IV access, and meds changed to subq. Morphine for pain and dyspnea and Ativan and Haldol for Agitation. GOALS OF CARE:   Continue to monitor for optimal patient comfort and symptom management. Ongoing family support. Signed by: Narinder Dumont       [facility] IDG  Notes by Karyle Snow at 05/29/17 1204  Version 1 of 1    Author:  Karyle Snow Service:  Tarsha Chen Author Type:      Filed:  05/29/17 1208 Date of Service:  05/29/17 1204 Status:  Signed    :  Karyle Snow ()           Patient: Juvenal     Date: 05/29/17  Time: 12:04 PM    [facility]  Notes  LMSW will complete the initial SW assessment. Pt is a fall risk. Signed by: Karyle Snow       [facility] IDG Volunteer Notes by Carmelita Shepherd at 05/29/17 1151  Version 1 of 1    Author:  Carmelita Shepherd Service:  Tarsha Chen Author Type:  Hospice Volunteer/    Filed:  05/29/17 1152 Date of Service:  05/29/17 1151 Status:  Signed    :  Carmelita Shepherd (Hospice Volunteer/)               [facility] [place] [place] Interdisciplinary Plan of Care Review     Status Codes I = Initiated C=Continued R=Revised RS = Resolved     I.  Volunteer     Goal: Hospice house volunteer (s) enhances the quality of remaining life while patient is at the hospice house. Interventions: Natasha Troy Volunteer (s) will provide companionship to the patient and/or family by visiting at the hospice house       . Padmini Gray Volunteer (s) will provide respite as needed when requested by patient and/or family. Padmini Durant  Volunteer will provide activities such as music, reading, pet therapy, etc. as requested. Padmini Durant  Comfort bag delivered. Any other special requests or information regarding volunteer services:    Comfort Bag delivered. Staff have asked for companionship visits to the family and volunteers have been notified. No further needs identified at this time. These notes have been discussed in [facility] meeting.           Signed by: Kiara Sewell

## 2017-05-30 NOTE — PROGRESS NOTES
Rounding on patient, who is beginning to arouse and become agitated between PRN dosing. Gurgling also noted. Medicated as appropriate. Breathing is loud and regular, continues to be open-mouthed.   Worthy Lack

## 2017-05-31 NOTE — PROGRESS NOTES
Family here and udpated after privacy code confirmed. We discuss his lack of oral intake, his decreasing consciousness and anticipated continued decline and death over the next week. Family is appropriate, appreciative, ask questions and show interest in answers. He does not respond to family's presence. FLACC 0.

## 2017-05-31 NOTE — PROGRESS NOTES
Assumed care of patient from AM RN. No family at bedside. Patient assessed at this time. Patient supine with head turned back to elongate esophagus, respirations shallow at 24 breaths per minute. Trace mottling to BLE.   Chelsea Mclaughlin

## 2017-05-31 NOTE — PROGRESS NOTES
Id, bedside report. He does not respond to conversation in the room. FLACC 0. Respirations dry, easy @ 16/min.

## 2017-05-31 NOTE — PROGRESS NOTES
Initial  Visit was provided by Father Leopold Dine. He provided a caring ministerial presence and [facility]ointing [facility] The Sick. This charging is being done by Luic Quiñones after speaking with Father Gold Panchal about his visit. [facility]  to follow up. Kameron Huang

## 2017-05-31 NOTE — PROGRESS NOTES
Progress Note    Patient: Humberto Lee MRN: [id number]  SSN: [id number]    YOB: 1917  Age: 80 y.o. Sex: male      Admit Date: 5/27/2017    LOS: 4 days     Subjective:     Unresponsive. NPO since 5/27. Has required 8 doses of Morphine for pain and dyspnea, Glycopyrrolate x 1 for secretions, Ativan x 1 IM and Haldol x 5 SQ for agitation. Family at bedside. Review of Systems:  Review of systems not obtained due to patient factors. Objective:     Vitals:    05/29/17 1911 05/30/17 0326 05/30/17 1636 05/31/17 0411   BP:  117/68 (!) 87/66 119/75   Pulse:  88 (!) 114 93   Resp: 18 16 16 16   Temp:  96.5 °F (35.8 °C) 98.2 °F (36.8 °C) 96.5 °F (35.8 °C)        Intake and Output:  Current Shift:    Last three shifts: 05/29 1901 - 05/31 0700  In: -   Out: 650 [Urine:650]    Physical Exam:   GENERAL: Unresponsive, mild distress, appears stated age  LUNG: Coarse breath sounds with labored respirations. HEART: irregular rate and rhythm  ABDOMEN: soft, non-tender. Bowel sounds hypoactive. No masses,  no organomegaly  : Bunch catheter with liana urine. EXTREMITIES:  extremities normal, atraumatic, no cyanosis or edema. SKIN: Cool to touch. Healing skin tear on left arm with steri-strips in place. NEUROLOGIC: Unresponsive. Bedbound. Unable to follow commands. Lab/Data Review:  No new labs resulted in the last 24 hours.     Assessment:     Principal Problem:    Encephalopathy, metabolic (9/71/5125)    Active Problems:    Delirium due to another medical condition, acute, mixed level of activity (5/27/2017)        Plan:     Current Facility-Administered Medications   Medication Dose Route Frequency    haloperidol lactate (HALDOL) injection 2 mg  2 mg SubCUTAneous Q8H    glycopyrrolate (ROBINUL) injection 0.2 mg  0.2 mg SubCUTAneous Q4H PRN    LORazepam (ATIVAN) tablet 1 mg  1 mg SubLINGual Q2H PRN    LORazepam (ATIVAN) injection 1 mg  1 mg IntraMUSCular Q2H PRN    morphine injection 2 mg  2 mg SubCUTAneous Q20MIN PRN    acetaminophen (TYLENOL) suppository 650 mg  650 mg Rectal Q3H PRN    bisacodyl (DULCOLAX) suppository 10 mg  10 mg Rectal PRN    haloperidol lactate (HALDOL) injection 2 mg  2 mg SubCUTAneous Q1H PRN    haloperidol lactate (HALDOL) injection 2 mg  2 mg SubCUTAneous Q1H PRN     Admitted with metabolic encephalopathy for management of pain and agitation. 1. Pain: Morphine as ordered. 2. Agitation: Haloperidol and Lorazepam as ordered. Added scheduled Haldol. 3. Family/Pt Support: Family at bedside during exam. Medications and plan of care discussed with nursing staff and family. Will continue to monitor for symptoms and adjust medications as needed to maintain patient comfort. PPS 10%. Case discussed with Dr. Supriya Reese. Last rites completed by  5/30.     Signed By: Angie Pacheco NP     May 31, 2017

## 2017-05-31 NOTE — PROGRESS NOTES
Daughter at the bedside, tearful trying to understand her decisions and if she made the right ones. We discuss her assistance from medical people, her discussions with him in the past and people's impressive ability to second guess themselves into the ground. She is assured, calmed and asks appropriate questions at this time. He does not respond to activity in the room and she is appreciative of his care. FLACC 0.

## 2017-05-31 NOTE — PROGRESS NOTES
Daughter again tearful, questioning herself. We review previous conversation and she smiles and apologizes. She is encouraged to always ask us questions, always say what has her worried. She denies further questions/concerns.

## 2017-05-31 NOTE — PROGRESS NOTES
Rounding on patient at this time. Patient continues to use agonal breathing with his neck elongated. Respiratory rate still 24.   Sheron Peabody

## 2017-05-31 NOTE — PROGRESS NOTES
He exhibits considerable myoclonic activity of extremities, is restless and yelling. He attempts to grab the siderail, but doesn't open his eyes and doesn't follow commands. Medicated.

## 2017-05-31 NOTE — PROGRESS NOTES
Assessment, ESAS, fall risk and montrell completed. He does not assist or resist.  Restful. FLACC 0.

## 2017-05-31 NOTE — PROGRESS NOTES
Repositioned. He is restful and does not interact or verbalize. Eyes remain closed. Unaccompanied. Skin cool, pale, dry. FLACC + moaning and restlessness. RR 20.

## 2017-06-01 NOTE — PROGRESS NOTES
Physical assessment completed, pt non-responsive except when attempted to do mouth care, pt groaned.

## 2017-06-01 NOTE — PROGRESS NOTES
Pt dyspneic and was groaning when turned and repositioned, administered morphine for pain and dyspnea

## 2017-06-01 NOTE — ROUTINE PROCESS
Pt with eyes closed. Calm. No distress. No facial grimace. Flacc =0-1. Resp non labored on RA. SR up x2. Bed low/locked. Call light with in reach. Tab alerts on. Door opened.

## 2017-06-01 NOTE — PROGRESS NOTES
Progress Note    Patient: Ingris Cruz MRN: [id number]  SSN: [id number]    YOB: 1917  Age: 80 y.o. Sex: male      Admit Date: 5/27/2017    LOS: 5 days     Clinical Summary:     Nayeli Rosalse remains unresponsive though comfortable, lying in bed. His pulse was rapid and irregular and his mouth is dry. He has moderate pulmonary congestion and not much in way respiratory distress. There are no lateralizing neurologic abnormalities. The remainder of his examination is unchanged. His admission CTI is reviewed. Vitals:    05/30/17 1636 05/31/17 0411 05/31/17 1739 06/01/17 0545   BP: (!) 87/66 119/75 139/71 114/76   Pulse: (!) 114 93 (!) 109 (!) 111   Resp: 16 16 17 20   Temp: 98.2 °F (36.8 °C) 96.5 °F (35.8 °C) 97.7 °F (36.5 °C) 97.2 °F (36.2 °C)            Clinical Assessment:     Principal Problem:    Encephalopathy, metabolic (9/10/7129)    Active Problems:    Delirium due to another medical condition, acute, mixed level of activity (5/27/2017)        Treatment Plan:      I have reviewed the patient's Plan of Care with the nursing staff. I see no indication for changing his pharmacologic regimen at the current time. He appears to bestable. I would anticipate death within the next week.           Current Facility-Administered Medications   Medication Dose Route Frequency    haloperidol lactate (HALDOL) injection 2 mg  2 mg SubCUTAneous Q8H    glycopyrrolate (ROBINUL) injection 0.2 mg  0.2 mg SubCUTAneous Q4H PRN    LORazepam (ATIVAN) tablet 1 mg  1 mg SubLINGual Q2H PRN    LORazepam (ATIVAN) injection 1 mg  1 mg IntraMUSCular Q2H PRN    morphine injection 2 mg  2 mg SubCUTAneous Q20MIN PRN    acetaminophen (TYLENOL) suppository 650 mg  650 mg Rectal Q3H PRN    bisacodyl (DULCOLAX) suppository 10 mg  10 mg Rectal PRN    haloperidol lactate (HALDOL) injection 2 mg  2 mg SubCUTAneous Q1H PRN    haloperidol lactate (HALDOL) injection 2 mg  2 mg SubCUTAneous Q1H PRN           Signed By: Gunnar Quinones MD     June 1, 2017

## 2017-06-01 NOTE — ROUTINE PROCESS
Pt I'd by name and . Pt calm. No distress. No facial grimace. Flacc =0-1. Resp non labored on RA. Lungs diminished. BS diminished. HR irreg. Edema 2+ in BLE noted at this time. Bunch cath draining liana urine. SR up x2. Bed low/locked. Call light with in reach. Door opened. Tab alerts on.

## 2017-06-01 NOTE — ROUTINE PROCESS
Pt calm. Eyes closed. No facial grimace. Flacc =0-1. No distress. No agitation. Resp non labored on RA. Routine med given sq in left upper arm. Pt tolerated well. SR up x2. Bed low/locked. Call light with in reach. Door opened. Tab alerts on.

## 2017-06-01 NOTE — [facility] IDG VOLUNTEER NOTES
[address] Review     Status Codes I = Initiated C=Continued R=Revised RS = Resolved     I.  Volunteer     Goal: Hospice house volunteer (s) enhances the quality of remaining life while patient is at the hospice house. Interventions: [facility] Shaun Wasatch Volunteer (s) will provide companionship to the patient and/or family by visiting at the hospice house       . [facility] Shaun Aliya Volunteer (s) will provide respite as needed when requested by patient and/or family. [facility] Junito Spine  Volunteer will provide activities such as music, reading, pet therapy, etc. as requested. [facility] Junito Spine  Comfort bag delivered. Any other special requests or information regarding volunteer services:    Comfort bag delivered. No further needs identified at this time. These notes have been discussed in [facility] meeting.           Signed by: Sherrill Larson

## 2017-06-01 NOTE — PROGRESS NOTES
Situation:  Juvenal Lowery is a 80 y.o. male who was residing at the [address] [place] [address]. He recently has had some falls. He has been treated with antibiotics for infection with no response. His Hospice Diagnosis is Metabolic Encephalopathy. Assessment:  [facility] followed up after Lina Prajapati. Father Luciano Conteh has also provided care for patient.  Lisa Elliott facilitated that process. Today patient is unable to communicate.  offered support with compassion and prayer at bedside. No family present. Bereavement: Unable to determine, patient is obtunded, no family present.

## 2017-06-01 NOTE — ROUTINE PROCESS
Pt with eyes closed. No distres. No facial grimace. Flacc =0-1. Resp non labored on RA. SR up x2. Bed low/locked. Call light with in reach. Tab alerts on. Door opened.

## 2017-06-01 NOTE — ROUTINE PROCESS
Pt with eyes closed. No distres. No facial grimace. Flacc =0-1. Resp non labored on RA. Pt repositioned. Mouth care completed. SR up x2. Bed low/locked. Call light with in reach. Tab alerts on. Door opened.

## 2017-06-01 NOTE — PROGRESS NOTES
Pt summary: pt was only responsive when turned or mouth care attempted. He was medicated x 3 for pain associated with turning and one of those times was for dyspnea. Pt only responds with groaning. pt's blood pressure has decreased to 90/40

## 2017-06-01 NOTE — PROGRESS NOTES
Pt groaning, moaning and grimacing, administered morphine and haldol sub q, bathed, and turned on his right side.

## 2017-06-01 NOTE — ROUTINE PROCESS
Pt with eyes closed. No distres. No facial grimace. Flacc =0-1. Resp non labored on RA. Pt repositioned. VS taken and documented. [employer] of 50cc liana urine. Routine med given sq in left upper arm. SR up x2. Bed low/locked. Call light with in reach. Tab alerts on. Door opened.

## 2017-06-02 NOTE — HSP[facility] IDG CHAPLAIN NOTES
Patient: Adan Love    Date: 06/02/17  Time: 12:16 PM    [facility]  Notes    Patient has been provided spiritual support from Both Father Jaison Wilbur and [employer] During last encounter patient was not responsive. Prayer offered at bedside.          Signed by: Sharon Rueda

## 2017-06-02 NOTE — PROGRESS NOTES
Progress Note    Patient: Rajan Lung MRN: [id number]  SSN: [id number]    YOB: 1917  Age: 80 y.o. Sex: male      Admit Date: 5/27/2017    LOS: 6 days     Subjective:     Unresponsive. NPO since 5/27. Has required 3 doses of Morphine for pain/dyspnea and Haldol x 2 SQ for agitation. Family at bedside. Review of Systems:  Review of systems not obtained due to patient factors. Objective:     Vitals:    05/31/17 1739 06/01/17 0545 06/01/17 1811 06/02/17 0520   BP: 139/71 114/76 90/40 119/64   Pulse: (!) 109 (!) 111 (!) 101    Resp: 17 20 24    Temp: 97.7 °F (36.5 °C) 97.2 °F (36.2 °C) 98.5 °F (36.9 °C)         Intake and Output:  Current Shift:    Last three shifts: 05/31 1901 - 06/02 0700  In: -   Out: 625 [Urine:625]    Physical Exam:   GENERAL: Unresponsive, no distress, appears stated age  LUNG: Coarse breath sounds with shallow respirations. HEART: irregular rate and rhythm  ABDOMEN: soft, non-tender. Bowel sounds absent. No masses,  no organomegaly  : Bunch catheter with dark yellow urine. EXTREMITIES:  extremities normal, atraumatic, no cyanosis or edema. SKIN: Cool to touch. Healing skin tear on left arm with steri-strips in place. NEUROLOGIC: Unresponsive. Bedbound. Unable to follow commands. Lab/Data Review:  No new labs resulted in the last 24 hours.     Assessment:     Principal Problem:    Encephalopathy, metabolic (9/27/2842)    Active Problems:    Delirium due to another medical condition, acute, mixed level of activity (5/27/2017)        Plan:     Current Facility-Administered Medications   Medication Dose Route Frequency    haloperidol lactate (HALDOL) injection 2 mg  2 mg SubCUTAneous Q8H    glycopyrrolate (ROBINUL) injection 0.2 mg  0.2 mg SubCUTAneous Q4H PRN    LORazepam (ATIVAN) tablet 1 mg  1 mg SubLINGual Q2H PRN    LORazepam (ATIVAN) injection 1 mg  1 mg IntraMUSCular Q2H PRN    morphine injection 2 mg  2 mg SubCUTAneous Q20MIN PRN    acetaminophen (TYLENOL) suppository 650 mg  650 mg Rectal Q3H PRN    bisacodyl (DULCOLAX) suppository 10 mg  10 mg Rectal PRN    haloperidol lactate (HALDOL) injection 2 mg  2 mg SubCUTAneous Q1H PRN    haloperidol lactate (HALDOL) injection 2 mg  2 mg SubCUTAneous Q1H PRN     Admitted with metabolic encephalopathy for management of pain and agitation. 1. Pain: Morphine as ordered. 2. Agitation: Haloperidol and Lorazepam as ordered. 3. Family/Pt Support: Family at bedside during exam. Medications and plan of care discussed with nursing staff and family. Will continue to monitor for symptoms and adjust medications as needed to maintain patient comfort. PPS 10%. Case discussed with Dr. Valorie Rivas and in [facility] [place]. Last rites completed by  5/30. No changes in plan of care.      Signed By: Chandra Charles NP     June 2, 2017

## 2017-06-02 NOTE — HSPC IDG NURSE NOTES
Patient: Davion Pinon    Date: 06/02/17  Time: 12:20 PM    [facility] Nurse Notes    UPDATE: Patient is a 8 year old Male, NPO since 5/27/2017, GIP for pain, and agitation symptoms, remains unresponsive per report. Haldol for agitation, Morphine for pain, and dyspnea. GOALS OF CARE:   Continue to monitor for optimal patient comfort and symptom management. Intermittent family support. Effectiveness of symptom management is continuously reevaluated for optimal comfort.             Signed by: Nirmal Stover RN MSN

## 2017-06-02 NOTE — PROGRESS NOTES
Pt summary: pt has been unresponsive, except when turned, pt was medicated x 2 before turning. Pt had one prn dose of haldol for agitation and restlessness. Did frequent mouth care, pt was less agitated about mouth care today. Spoke with pt's daughter a couple of times, she received updates on his physical assessments. Pt checked every hour throughout the day.

## 2017-06-02 NOTE — PROGRESS NOTES
Turned and repositioned on left side , pre-medicated with morphine prior to turning. Pt tolerated by moaning and groaning.

## 2017-06-02 NOTE — [facility] IDG MASTER NOTE
Hospice Interdisciplinary Group Collaborative  Date: 06/02/17  Time: 1:29 PM    ___________________    Patient: John Finn    ___________________    Diagnoses:  Diagnoses of Delirium due to another medical condition, acute, mixed level of activity, JODI (acute kidney injury) ([facility] [address] Iron deficiency anemia, unspecified iron deficiency anemia type, Lumbar compression fracture, closed, initial encounter, Hyperglycemia, and Persistent atrial fibrillation ([facility] [address] were pertinent to this visit.     Current Medications:    Current Facility-Administered Medications:     haloperidol lactate (HALDOL) injection 2 mg, 2 mg, SubCUTAneous, Q8H, Haley Nina, NP, 2 mg at 06/02/17 0641    glycopyrrolate (ROBINUL) injection 0.2 mg, 0.2 mg, SubCUTAneous, Q4H PRN, Haley Wood NP, 0.2 mg at 05/30/17 1752    LORazepam (ATIVAN) tablet 1 mg, 1 mg, SubLINGual, Q2H PRN, Anjel Vuong MD, 1 mg at 05/28/17 2001    LORazepam (ATIVAN) injection 1 mg, 1 mg, IntraMUSCular, Q2H PRN, Anjel Vuong MD, 1 mg at 05/30/17 1017    morphine injection 2 mg, 2 mg, SubCUTAneous, Q20MIN PRN, 2 mg at 06/02/17 1049 **OR** [DISCONTINUED] morphine injection 2 mg, 2 mg, IntraVENous, Q20MIN PRN, Anjel Vuong MD    acetaminophen (TYLENOL) suppository 650 mg, 650 mg, Rectal, Q3H PRN, Anjel Vuong MD    bisacodyl (DULCOLAX) suppository 10 mg, 10 mg, Rectal, PRN, Anjel uVong MD    haloperidol lactate (HALDOL) injection 2 mg, 2 mg, SubCUTAneous, Q1H PRN, Anjel Vuong MD, 2 mg at 05/31/17 0812    haloperidol lactate (HALDOL) injection 2 mg, 2 mg, SubCUTAneous, Q1H PRN, Anjel Vuong MD, 2 mg at 06/02/17 9331    Orders:  Orders Placed This Encounter    DIET PLEASURE     Standing Status:   Standing     Number of Occurrences:   1     Order Specific Question:   Likes/Dislikes/Preferences     Answer:   hold tray    VITAL SIGNS     Standing Status:   Standing     Number of Occurrences:   1    NURSING-MISCELLANEOUS: Comfort Care Measures CONTINUOUS     Standing Status:   Standing     Number of Occurrences:   1     Order Specific Question:   Description of Order:     Answer:   Comfort Care Measures    YANG CATHETER, CARE     1. Yang Catheter care every shift and PRN  2. Notify Physician of Yang Catheter leakage, occlusion, gross adherent sediment or accidental removal  3. Change Yang 30 days after insertion. Standing Status:   Standing     Number of Occurrences:   1    BLADDER CHECKS     May scan bladder PRN for urinary retention and or patient discomfort     Standing Status:   Standing     Number of Occurrences:   1    NURSING ASSESSMENT:  SPECIFY Assess for GIP, routine, or respite level of care. Q SHIFT Routine     Standing Status:   Standing     Number of Occurrences:   1     Order Specific Question:   Please describe the test or procedure you would like to order. Answer:   Assess for GIP, routine, or respite level of care.  PAIN ASSESSMENT Pain and Symptoms: Assess ever 4 hours and PRN, for GIP level of care. PRN Routine     Standing Status:   Standing     Number of Occurrences:   1     Order Specific Question:   Please describe the test or procedure you would like to order. Answer:   Pain and Symptoms: Assess ever 4 hours and PRN, for GIP level of care.  BEDREST, COMPLETE     Standing Status:   Standing     Number of Occurrences:   1    WOUND CARE, DRESSING CHANGE     Wound Care:  Location: left arm skin tear with steri-strips  Skin tear: Please leave open to air. Notify provider if wound develops drainage. Standing Status:   Standing     Number of Occurrences:   34    NURSING-MISCELLANEOUS: Admitted GIP with metabolic encephalopathy for management of pain and agitation. CONTINUOUS     Standing Status:   Standing     Number of Occurrences:   1     Order Specific Question:   Description of Order:     Answer:   Admitted GIP with metabolic encephalopathy for management of pain and agitation.     NURSING-MISCELLANEOUS: v cert This is an order to admit to in patient hospice care, for a first 90 day benefit interval, a 8 year old man who was residing at [facility] [address]. In the past weeks he has had several falls. In the past few da. .. This is an order to admit to in patient hospice care, for a first 90 day benefit interval, a 8 year old man who was residing at [facility] [address]. In the past weeks he has had several falls. In the past few days he has developed a profound alteration of mental state, was brought to hospital for evaluation, which disclosed a new L5 compression fracture, atrial fibrillation with RVR, and acute renal injury with BUN 46, creatinine 2.2 mg/dL. Urinalysis is consistent with UTI. Throughout four days in hospital he has exhibited restlessness with tendency to climb over his bedrails and nearly continuous babbling nonsensical speech. He has been refusing solid food and medication but will intake frequent sips of milkshake and ensure. No specific infectious ideology has been demonstrated but the patient continues to rigors and leukocytosis, despite broad spectrum antibiotic therapy. His family has chosen at this time to stop life extending care including antibiotics, IV hydration, and cardiotropic medications. They seek to limit further care to comfort measures only. He continues to require parenteral opioid for acute fracture pain and parenteral psychotropic medication for delirium. The present clinical picture predicts his demise will occur within two weeks.           Primary diagnosis: Metabolic encephalopathy    Secondary:    UTI    Acute renal failure    L5 compression fracture    Atrial fibrillation with RVR    Macrocytic anemia     Standing Status:   Standing     Number of Occurrences:   1     Order Specific Question:   Description of Order:     Answer:   v cert    DO NOT RESUSCITATE     Standing Status:   Standing     Number of Occurrences:   1    OXYGEN CANNULA Liters per minute: 2; Indications for O2 therapy: HYPOXIA PRN Routine     Standing Status:   Standing     Number of Occurrences:   1     Order Specific Question:   Liters per minute: Answer:   2     Order Specific Question:   Indications for O2 therapy     Answer:   HYPOXIA    DISCONTD: sodium chloride (NS) flush 3 mL    morphine injection 2 mg    DISCONTD: morphine injection 2 mg    DISCONTD: haloperidol lactate (HALDOL) injection 2 mg    DISCONTD: diphenhydrAMINE (BENADRYL) injection 25 mg    acetaminophen (TYLENOL) suppository 650 mg    bisacodyl (DULCOLAX) suppository 10 mg    DISCONTD: haloperidol lactate (HALDOL) injection 2 mg    haloperidol lactate (HALDOL) injection 2 mg    haloperidol lactate (HALDOL) injection 2 mg    LORazepam (ATIVAN) tablet 1 mg    LORazepam (ATIVAN) injection 1 mg    glycopyrrolate (ROBINUL) injection 0.2 mg    haloperidol lactate (HALDOL) injection 2 mg    INITIAL PHYSICIAN ORDER: HOSPICE Level Of Care: General; Reason for Admission: delirium and fracture maryam     Standing Status:   Standing     Number of Occurrences:   1     Order Specific Question:   Status     Answer:   Hospice     Order Specific Question:   Level Of Care     Answer:   General     Order Specific Question:   Reason for Admission     Answer:   delirium and fracture maryam     Order Specific Question:   Inpatient Hospitalization Certified Necessary for the Following Reasons     Answer:   3.  Patient receiving treatment that can only be provided in an inpatient setting (further clarification in H&P documentation)     Order Specific Question:   Admitting Diagnosis     Answer:   Delirium due to another medical condition, acute, mixed level of activity [5847628]     Order Specific Question:   Terminal Prognosis Diagnosis(es)     Answer:   Vertebral compression fracture, initial encounter [facility]) [[id number]]     Order Specific Question:   Terminal Prognosis Diagnosis(es)     Answer:   UTI (urinary tract infection) [399983]     Order Specific Question:   Terminal Prognosis Diagnosis(es)     Answer:   Macrocytic anemia [142622]     Order Specific Question:   Admitting Physician     Answer:   Roger Peterson [[id number]]     Order Specific Question:   Attending Physician     Answer:   Roger Peterson [[id number]]     Order Specific Question:   Discharge Plan:     Answer:   Home with Home Hospice    INITIAL PHYSICIAN ORDER: HOSPICE Level Of Care: General; Reason for Admission: delirium, fracture pain     Standing Status:   Standing     Number of Occurrences:   1     Order Specific Question:   Status     Answer:   Hospice     Order Specific Question:   Level Of Care     Answer:   General     Order Specific Question:   Reason for Admission     Answer:   delirium, fracture pain     Order Specific Question:   Inpatient Hospitalization Certified Necessary for the Following Reasons     Answer:   3.  Patient receiving treatment that can only be provided in an inpatient setting (further clarification in H&P documentation)     Order Specific Question:   Admitting Diagnosis     Answer:   Encephalopathy, metabolic [[id number]]     Order Specific Question:   Terminal Prognosis Diagnosis(es)     Answer:   UTI (urinary tract infection) [102293]     Order Specific Question:   Terminal Prognosis Diagnosis(es)     Answer:   Fracture, lumbar vertebra, compression [facility]) [[id number]]     Order Specific Question:   Terminal Prognosis Diagnosis(es)     Answer:   Bone pain [332362]     Order Specific Question:   Terminal Prognosis Diagnosis(es)     Answer:   Hyperglycemia due to type 2 diabetes mellitus ([facility] [[id number]]     Order Specific Question:   Terminal Prognosis Diagnosis(es)     Answer:   Acute renal failure [facility]) [666463]     Order Specific Question:   Admitting Physician     Answer:   Roger Peterson [[id number]]     Order Specific Question:   Attending Physician     Answer:   Osiris Gallegos     Order Specific Question:   Discharge Plan: Answer: Other (Specify)       Allergies:  No Known Allergies    ___________________    Care Team Notes          POC/IDG Notes      [facility] IDG  Notes by Barbee Libman at 06/02/17 1219  Version 1 of 1    Author:  Barbee Libman Service:  Toy Chino Author Type:      Filed:  06/02/17 1223 Date of Service:  06/02/17 1219 Status:  Signed    :  Barbee Libman ()           Patient: Samaria Slater    Date: 06/02/17  Time: 12:19 PM    [facility]  Notes    LMSW will continue to provide emotional support to his family and pt. Family has chosen [facility]. Estefania Navarro has had last rights with Jaquan Irby. Family is coping well. Signed by: Barbee Libman       [facility] Nurse Notes by Marlee Cooper at 06/02/17 1220  Version 1 of 1    Author:  Marlee Cooper Service:  Toy Chino Author Type:  Registered Nurse    Filed:  06/02/17 1223 Date of Service:  06/02/17 1220 Status:  Signed    :  Marlee Cooper (Registered Nurse)           Patient: Samaria Slater    Date: 06/02/17  Time: 12:20 PM    [address] [place] [address] Nurse Notes    UPDATE: Patient is a 8 year old Male, NPO since 5/27/2017, GIP for pain, and agitation symptoms, remains unresponsive per report. Haldol for agitation, Morphine for pain, and dyspnea. GOALS OF CARE:   Continue to monitor for optimal patient comfort and symptom management. Intermittent family support. Effectiveness of symptom management is continuously reevaluated for optimal comfort.             Signed by: Marlee Cooper RN [address]  Notes by Vadim Morales at 06/02/17 1216  Version 1 of 1    Author:  Vadim Morales Service:  Spiritual Care Author Type:  Pastoral Care    Filed:  06/02/17 1222 Date of Service:  06/02/17 1216 Status:  Signed    :  Vadim Morales (Pastoral Care)           Patient: Samaria Slater    Date: 06/02/17  Time: 12:16 PM    [facility]  Notes    Patient has been provided spiritual support from Both Father Nazia Arriaga and Prerna Cantor. During last encounter patient was not responsive. Prayer offered at bedside. Signed by: Natalie Fabian       [facility] IDG Volunteer Notes by Sherrill Larson at 06/01/17 8180  Version 1 of 1    Author:  Sherrill Larson Service:  Catalina Melendez Author Type:  Hospice Volunteer/    Filed:  06/01/17 4930 Date of Service:  06/01/17 1349 Status:  Signed    :  Sherrill Larson (Hospice Volunteer/)               [facility] Interdisciplinary Plan of Care Review     Status Codes I = Initiated C=Continued R=Revised RS = Resolved     I.  Volunteer     Goal: Hospice house volunteer (s) enhances the quality of remaining life while patient is at the hospice house. Interventions: Shelvy Setting Shelvy Setting Shaun [place] Volunteer (s) will provide companionship to the patient and/or family by visiting at the hospice house       . Shelvy Setting Shaun [place] Volunteer (s) will provide respite as needed when requested by patient and/or family. Shelvy Setting Junito Spine  Volunteer will provide activities such as music, reading, pet therapy, etc. as requested. Shelvy Setting Junito Spine  Comfort bag delivered. Any other special requests or information regarding volunteer services:    Comfort bag delivered. No further needs identified at this time. These notes have been discussed in [facility] meeting. Signed by: Sherrill [facility] IDG Nurse Notes by Lisa Cordon at 05/30/17 1001  Version 1 of 1    Author:  Lisa Cordon Service:  Catalina Melendez Author Type:  Registered Nurse    Filed:  05/30/17 7990 Date of Service:  05/30/17 1347 Status:  Signed    :  Lisa Cordon (Registered Nurse)           Patient: Olivia Og    Date:05/29/2017  Time: 12:30 PM    [facility] Nurse Notes    UPDATE: Patient is a 8 year old Male, with history of falls and diagnosis of Metabolic Encephalopy. GIP for pain,, agitation and delirium symptom management.  Poor IV access, and meds changed to subq. Morphine for pain and dyspnea and Ativan and Haldol for Agitation. GOALS OF CARE:   Continue to monitor for optimal patient comfort and symptom management. Ongoing family support. Signed by: Xuan BENNETT [facility] IDG  Notes by Martha Segal at 05/29/17 1204  Version 1 of 1    Author:  Martha Segal Service:  Jesus Riddle Author Type:      Filed:  05/29/17 1208 Date of Service:  05/29/17 1204 Status:  Signed    :  Martha Segal ()           Patient: Tameka Ford    Date: 05/29/17  Time: 12:04 PM    [facility]  Notes  LMSW will complete the initial SW assessment. Pt is a fall risk. Signed by: Martha Segal       [facility] IDG Volunteer Notes by Wagner Johnson at 05/29/17 1151  Version 1 of 1    Author:  Wagner Johnson Service:  Jesus Riddle Author Type:  Hospice Volunteer/    Filed:  05/29/17 1152 Date of Service:  05/29/17 1151 Status:  Signed    :  Wagner Johnson (Hospice Volunteer/)               [facility] Interdisciplinary Plan of Care Review     Status Codes I = Initiated C=Continued R=Revised RS = Resolved     I.  Volunteer     Goal: Hospice house volunteer (s) enhances the quality of remaining life while patient is at the hospice house. Interventions: Chani Christian Volunteer (s) will provide companionship to the patient and/or family by visiting at the hospice house       . Chani Christian Volunteer (s) will provide respite as needed when requested by patient and/or family. Chani Levy  Volunteer will provide activities such as music, reading, pet therapy, etc. as requested. Chani Levy  Comfort bag delivered. Any other special requests or information regarding volunteer services:    Comfort Bag delivered. Staff have asked for companionship visits to the family and volunteers have been notified.   No further needs identified at this time. These notes have been discussed in [facility] meeting.           Signed by: Zara Briseno

## 2017-06-03 NOTE — PROGRESS NOTES
Progress Note    Patient: Tamia Syed MRN: [id number]  SSN: [id number]    YOB: 1917  Age: 80 y.o. Sex: male      Admit Date: 5/27/2017    LOS: 7 days     Subjective:     I saw Selin Sanchez on afternoon rounds. His 2 daughters were at bedside. They state that he was minimally responsive to spoken word earlier in the morning. They are aware of  Marked decrease in his blood pressure which occurred earlier in the morning. He is warm to the touch and has repeatedly pushed down his blankets and sheets. Review of Systems:  Review of systems not obtained due to patient factors. Objective:     Vitals:    06/01/17 1811 06/02/17 0520 06/02/17 1810 06/03/17 0515   BP: 90/40 119/64 111/75 (!) 76/55   Pulse: (!) 101  (!) 109 95   Resp: 24  28 26   Temp: 98.5 °F (36.9 °C)  97.9 °F (36.6 °C) 96.8 °F (36 °C)        Intake and Output:  Current Shift:    Last three shifts: 06/01 1901 - 06/03 0700  In: -   Out: 600 [Urine:600]    Physical Exam:   Finds and unconsciousness centagenarian white man lying supine, breathing quietly. Hands and feet are warm dorsalis and radial pulses are appreciable (contradicting automated blood pressure measured at 75 systolic). .  There are coarse rhonchi in the anterior chest.  Lab/Data Review:  No new labs resulted in the last 24 hours. The patient has required no Haldol on a when necessary basis in the last 24 hours. His agitation has quieted over the last 48 hours. He has had no food or fluid in for greater than 6 days. Urine output is now down to less than 100 ML's per 12 hour shift.     Assessment:     Principal Problem:    Encephalopathy, metabolic (9/90/6156)    Active Problems:    Delirium due to another medical condition, acute, mixed level of activity (5/27/2017)    advancing dehydration and renal failure  Health facility acquired pneumonia  Plan:     Current Facility-Administered Medications   Medication Dose Route Frequency    haloperidol lactate (HALDOL) injection 2 mg  2 mg SubCUTAneous Q8H    glycopyrrolate (ROBINUL) injection 0.2 mg  0.2 mg SubCUTAneous Q4H PRN    LORazepam (ATIVAN) tablet 1 mg  1 mg SubLINGual Q2H PRN    LORazepam (ATIVAN) injection 1 mg  1 mg IntraMUSCular Q2H PRN    morphine injection 2 mg  2 mg SubCUTAneous Q20MIN PRN    acetaminophen (TYLENOL) suppository 650 mg  650 mg Rectal Q3H PRN    bisacodyl (DULCOLAX) suppository 10 mg  10 mg Rectal PRN    haloperidol lactate (HALDOL) injection 2 mg  2 mg SubCUTAneous Q1H PRN    haloperidol lactate (HALDOL) injection 2 mg  2 mg SubCUTAneous Q1H PRN       I spoke for some time at bedside the patient's daughters. I applauded their continued steady present at bedside as and undeniable comfort to the patient. I shared with them my opinion that their father and has survived longer than would reasonably expected from a 8-year-old man in this situation. The daughter shared with me that [facility] had provided care in their home for the patient's spouse several years ago. They strongly commended Adonis jo RN. No change in comfort measures is necessary at this time. I anticipate patient's demise will occur within 24 hours.     Signed By: Korey Mcmahan MD     Gifty 3, 2017

## 2017-06-03 NOTE — PROGRESS NOTES
Assumed care from off going RN. Walking rounds done with report. Pt I'd by name and . Pt calm. No distress. No facial grimace. Flacc 0/10 . Respirations easy and unlabored. SR up x2. Bed low/locked. Call light with in reach. Door opened. Tab alerts on.

## 2017-06-03 NOTE — PROGRESS NOTES
Pt in bed with eyes closed. Pt displaying no signs or symptoms of pain, dyspnea, agitation,nausea, or vomiting. Flacc 0/10. Bed locked and low, side rails up, tabs/bed alarm in place, call light with in reach, and door opened for continued monitoring.

## 2017-06-03 NOTE — PROGRESS NOTES
Updated daughter and daughter in law, pt was also experiencing pain and agitation, prn medications given

## 2017-06-05 NOTE — [facility] IDG CHAPLAIN NOTES
Patient: Harman Snell    Date: 06/05/17  Time: 1:40 PM    [facility]  Notes     to continue support for patient and family throughout patient's time at [place].           Signed by: Donovan Tate

## 2017-06-05 NOTE — [facility] IDG SOCIAL WORKER NOTES
Patient: Annamaria Essex    Date: 06/05/17  Time: 1:41 PM    [facility]  Notes  LMSW will continue to support the family and the patient. Patient is having rapid respirations. Medications have been adjusted today.           Signed by: Megan Menendez

## 2017-06-05 NOTE — PROGRESS NOTES
Pt RR remains elevated in low -mid 30's despite morphine. Slight elevation in temperature. Dusky color to face. This RN called China Calles Many to talk about changes noted. China Many appreciated the call and will be in later this afternoon to visit.

## 2017-06-05 NOTE — PROGRESS NOTES
RN called into room by CNA for no respirations. Absence of HR and respirations noted at 1845. Lisa Bridges Dtr called by this RN, tearful but accepting and expecting. Thankful RN had reached out earlier to inform of change so she could be mentally preparing. Family does want to return to the bedside. Princess wilson RN updated.    Post mortem care completed by Georgiana Trevino.

## 2017-06-05 NOTE — [facility] IDG NURSE NOTES
Patient: Melba Coyle    Date: 06/05/17  Time: 1:41 PM    [facility] Nurse Notes    UPDATE: Patient is a 8 year old Male, GIP for pain and Dyspnea. Increase in respiratory rate this am, per report to 30 breaths upward per minute. Morphine increased. GOALS OF CARE:   Continue to monitor for optimal patient comfort and symptom management. Ongoing family support.           Signed by: Kendra Rodgers RN MSN

## 2017-06-05 NOTE — PROGRESS NOTES
Progress Note    Patient: Dada Hughes MRN: [id number]  SSN: [id number]    YOB: 1917  Age: 80 y.o. Sex: male      Admit Date: 5/27/2017    LOS: 9 days     Subjective:     Unresponsive. Per nursing, has required multiple doses of morphine today for respiratory distress. Review of Systems:  Review of systems not obtained due to patient factors. Objective:     Vitals:    06/05/17 0929 06/05/17 1003 06/05/17 1125 06/05/17 1212   BP:       Pulse:       Resp: (!) 32 (!) 34 (!) 32 (!) 34   Temp:   99.4 °F (37.4 °C) 99.2 °F (37.3 °C)        Intake and Output:  Current Shift:    Last three shifts: 06/03 1901 - 06/05 0700  In: 0   Out: 150 [Urine:150]    Physical Exam:   GENERAL: Unresponsive, no distress, appears stated age  LUNG: Coarse breath sounds with shallow respirations. Tachypnea with rate in mid 40's. HEART: irregular rate and rhythm. Bounding pulses  ABDOMEN: soft, non-tender. Bowel sounds absent. No masses, no organomegaly  : Bunch catheter insitu and patent with small amount of dark yellow urine. EXTREMITIES: extremities normal, atraumatic, no cyanosis or edema. SKIN: Hot to touch. Healing skin tear on left arm with steri-strips in place. NEUROLOGIC: Unresponsive. Lab/Data Review:  No new labs resulted in the last 24 hours.     Assessment:     Principal Problem:    Encephalopathy, metabolic (4/57/2600)    Active Problems:    Delirium due to another medical condition, acute, mixed level of activity (5/27/2017)        Plan:     Current Facility-Administered Medications   Medication Dose Route Frequency    morphine injection 4 mg  4 mg SubCUTAneous Q20MIN PRN    haloperidol lactate (HALDOL) injection 2 mg  2 mg SubCUTAneous Q8H    glycopyrrolate (ROBINUL) injection 0.2 mg  0.2 mg SubCUTAneous Q4H PRN    LORazepam (ATIVAN) tablet 1 mg  1 mg SubLINGual Q2H PRN    LORazepam (ATIVAN) injection 1 mg  1 mg IntraMUSCular Q2H PRN    acetaminophen (TYLENOL) suppository 650 mg  650 mg Rectal Q3H PRN    bisacodyl (DULCOLAX) suppository 10 mg  10 mg Rectal PRN    haloperidol lactate (HALDOL) injection 2 mg  2 mg SubCUTAneous Q1H PRN    haloperidol lactate (HALDOL) injection 2 mg  2 mg SubCUTAneous Q1H PRN     1. Admitted with metabolic encephalopathy for management of pain, agitation, and pt/family support.     2. Pain: Morphine PRN increased to 4 mg.      3. Agitation: Haloperidol and Lorazepam PRN.     4. Family/Pt Support: No family at bedside during exam. Ongoing plan of care including meds discussed with primary nursing. Continue to monitor and palliate symptoms as they arise. Nearing demise and death likely within the next 12-24 hours. PPS 10%.       Signed By: Garrett Graham NP     June 5, 2017

## 2017-06-06 NOTE — HSPC IDG MASTER NOTE
Hospice Interdisciplinary Group Collaborative  Date: 06/06/17  Time: 3:02 PM    ___________________    Patient: Alberta Rogers I  ___________________    Diagnoses:  Diagnoses of Delirium due to another medical condition, acute, mixed level of activity, JODI (acute kidney injury) ([facility] [address] Iron deficiency anemia, unspecified iron deficiency anemia type, Lumbar compression fracture, closed, initial encounter, Hyperglycemia, and Persistent atrial fibrillation ([facility] [address] were pertinent to this visit. Current Medications:  No current facility-administered medications for this encounter. Current Outpatient Prescriptions:     cefpodoxime (VANTIN) 200 mg tablet, Take 1 Tab by mouth two (2) times a day., Disp: 2 Tab, Rfl: 0    mirtazapine (REMERON) 15 mg tablet, Take 15 mg by mouth nightly., Disp: , Rfl:     bisacodyl (DULCOLAX) 5 mg EC tablet, Take 5 mg by mouth daily as needed for Constipation. , Disp: , Rfl:     calcipotriene (DOVONOX) 0.005 % ointment, Apply  to affected area two (2) times a day., Disp: , Rfl:     ferrous gluconate 324 mg (38 mg iron) tablet, Take 324 mg by mouth Daily (before breakfast). , Disp: , Rfl:     ranitidine (ZANTAC) 150 mg tablet, Take 150 mg by mouth two (2) times a day., Disp: , Rfl:     cyanocobalamin 1,000 mcg tablet, Take 1,000 mcg by mouth daily. , Disp: , Rfl:     cholecalciferol (VITAMIN D3) 1,000 unit cap, Take 1,000 Units by mouth daily. , Disp: , Rfl:     sennosides 8.6 mg cap, Take 1 Cap by mouth., Disp: , Rfl:     acetaminophen (TYLENOL) 325 mg tablet, Take 500 mg by mouth every four (4) hours as needed for Pain., Disp: , Rfl:     DIGOX 125 mcg tablet, TAKE 1 TABLET BY MOUTH ONCE DAILY. , Disp: 30 Tab, Rfl: 11    DILT- mg XR capsule, TAKE 1 CAPSULE BY MOUTH ONCE DAILY. , Disp: 30 Cap, Rfl: 11    furosemide (LASIX) 40 mg tablet, TAKE 1 TABLET BY MOUTH ONCE DAILY. , Disp: 30 Tab, Rfl: 11    polyethylene glycol (MIRALAX) 17 gram packet, Take 17 g by mouth daily. , Disp: , Rfl:     POTASSIUM CHLORIDE SR 10 MEQ TAB, 10 mEq daily. , Disp: , Rfl:     loperamide (IMODIUM) 2 mg capsule, Take 2 mg by mouth., Disp: , Rfl:     Orders:  Orders Placed This Encounter    DISCONTD: sodium chloride (NS) flush 3 mL    DISCONTD: morphine injection 2 mg    DISCONTD: morphine injection 2 mg    DISCONTD: haloperidol lactate (HALDOL) injection 2 mg    DISCONTD: diphenhydrAMINE (BENADRYL) injection 25 mg    DISCONTD: acetaminophen (TYLENOL) suppository 650 mg    DISCONTD: bisacodyl (DULCOLAX) suppository 10 mg    DISCONTD: haloperidol lactate (HALDOL) injection 2 mg    DISCONTD: haloperidol lactate (HALDOL) injection 2 mg    DISCONTD: haloperidol lactate (HALDOL) injection 2 mg    DISCONTD: LORazepam (ATIVAN) tablet 1 mg    DISCONTD: LORazepam (ATIVAN) injection 1 mg    DISCONTD: glycopyrrolate (ROBINUL) injection 0.2 mg    DISCONTD: haloperidol lactate (HALDOL) injection 2 mg    DISCONTD: morphine injection 4 mg    DISCONTD: morphine injection 4 mg    INITIAL PHYSICIAN ORDER: HOSPICE Level Of Care: General; Reason for Admission: delirium and fracture maryam     Standing Status:   Standing     Number of Occurrences:   1     Order Specific Question:   Status     Answer:   Hospice     Order Specific Question:   Level Of Care     Answer:   General     Order Specific Question:   Reason for Admission     Answer:   delirium and fracture maryam     Order Specific Question:   Inpatient Hospitalization Certified Necessary for the Following Reasons     Answer:   3.  Patient receiving treatment that can only be provided in an inpatient setting (further clarification in H&P documentation)     Order Specific Question:   Admitting Diagnosis     Answer:   Delirium due to another medical condition, acute, mixed level of activity [[id number]]     Order Specific Question:   Terminal Prognosis Diagnosis(es)     Answer:   Vertebral compression fracture, initial encounter [facility]) [[id number]]     Order Specific Question:   Terminal Prognosis Diagnosis(es)     Answer:   UTI (urinary tract infection) [371555]     Order Specific Question:   Terminal Prognosis Diagnosis(es)     Answer:   Macrocytic anemia [798304]     Order Specific Question:   Admitting Physician     Answer:   Elham Tyler [[id number]]     Order Specific Question:   Attending Physician     Answer:   Dalila Javed     Order Specific Question:   Discharge Plan:     Answer:   Home with Home Hospice    INITIAL PHYSICIAN ORDER: HOSPICE Level Of Care: General; Reason for Admission: delirium, fracture pain     Standing Status:   Standing     Number of Occurrences:   1     Order Specific Question:   Status     Answer:   Hospice     Order Specific Question:   Level Of Care     Answer:   General     Order Specific Question:   Reason for Admission     Answer:   delirium, fracture pain     Order Specific Question:   Inpatient Hospitalization Certified Necessary for the Following Reasons     Answer:   3.  Patient receiving treatment that can only be provided in an inpatient setting (further clarification in H&P documentation)     Order Specific Question:   Admitting Diagnosis     Answer:   Encephalopathy, metabolic [[id number]]     Order Specific Question:   Terminal Prognosis Diagnosis(es)     Answer:   UTI (urinary tract infection) [182467]     Order Specific Question:   Terminal Prognosis Diagnosis(es)     Answer:   Fracture, lumbar vertebra, compression [facility]) [[id number]]     Order Specific Question:   Terminal Prognosis Diagnosis(es)     Answer:   Bone pain [770830]     Order Specific Question:   Terminal Prognosis Diagnosis(es)     Answer:   Hyperglycemia due to type 2 diabetes mellitus ([facility] [4176512]     Order Specific Question:   Terminal Prognosis Diagnosis(es)     Answer:   Acute renal failure [facility]) [527364]     Order Specific Question:   Admitting Physician     Answer:   Elham Tyler [[id number]]     Order Specific Question:   Attending Physician     Answer: Marlyn Alamo [[id number]]     Order Specific Question:   Discharge Plan:     Answer: Other (Specify)       Allergies:  No Known Allergies    ___________________    Care Team Notes          POC/IDG Notes      [facility] IDG Nurse Notes by Cr Wilkes at 06/05/17 1341  Version 1 of 1    Author:  Cr Wilkes Service:  Rosaline Echavarria Author Type:  Registered Nurse    Filed:  06/05/17 1342 Date of Service:  06/05/17 1341 Status:  Signed    :  Cr Wilkes (Registered Nurse)           Patient: Marissa Raza    Date: 06/05/17  Time: 1:41 PM    [facility] Nurse Notes    UPDATE: Patient is a 8 year old Male, GIP for pain and Dyspnea. Increase in respiratory rate this am, per report to 30 breaths upward per minute. Morphine increased. GOALS OF CARE:   Continue to monitor for optimal patient comfort and symptom management. Ongoing family support. Signed by: Cr Wilkes RN [facility] ID  Notes by Jose Potter at 06/05/17 1341  Version 1 of 1    Author:  Jose Potter Service:  Rosaline Echavarria Author Type:      Filed:  06/05/17 1342 Date of Service:  06/05/17 1341 Status:  Signed    :  Jose Potter ()           Patient: Marissa Raza    Date: 06/05/17  Time: 1:41 PM    [facility]  Notes  LMSW will continue to support the family and the patient. Patient is having rapid respirations. Medications have been adjusted today. Signed by: Jose Potter       [facility] IDG  Notes by Marni Luke at 06/05/17 1340  Version 1 of 1    Author:  Marni Luke Service:  Spiritual Care Author Type:  Pastoral Care    Filed:  06/05/17 1342 Date of Service:  06/05/17 1340 Status:  Signed    :  Marni Luke ([address])           Patient: Marissa Raza    Date: 06/05/17  Time: 1:40 PM    [facility]  Notes     to continue support for patient and family throughout patient's time at [facility].           Signed by: Donovan Tate       [facility] IDG  Notes by Delfino Roberts at 06/02/17 1219  Version 1 of 1    Author:  Delfino Roberts Service:  Zeinab Kapadia Author Type:      Filed:  06/02/17 1223 Date of Service:  06/02/17 1219 Status:  Signed    :  Delfino Roberts ()           Patient: Harman Snell    Date: 06/02/17  Time: 12:19 PM    \[facility]\""  Notes    LMSW will continue to provide emotional support to his family and pt. Family has chosen [facility]. Raj Patel has had last rights with Kristian Alvarez. Family is coping well. Signed by: Delfino Roberts       \[facility]\"" IDG Nurse Notes by Ben Roman at 06/02/17 1220  Version 1 of 1    Author:  Ben Roman Service:  Zeinab Kapadia Author Type:  Registered Nurse    Filed:  06/02/17 1223 Date of Service:  06/02/17 1220 Status:  Signed    :  Ben Roman (Registered Nurse)           Patient: Harman Snell    Date: 06/02/17  Time: 12:20 PM    [facility] Nurse Notes    UPDATE: Patient is a 8 year old Male, NPO since 5/27/2017, GIP for pain, and agitation symptoms, remains unresponsive per report. Haldol for agitation, Morphine for pain, and dyspnea. GOALS OF CARE:   Continue to monitor for optimal patient comfort and symptom management. Intermittent family support. Effectiveness of symptom management is continuously reevaluated for optimal comfort. Signed by: Ben Roman RN MSN       [facility] IDG  Notes by Donovan Tate at 06/02/17 1216  Version 1 of 1    Author:  Donovan Tate Service:  Spiritual Care Author Type:  Pastoral Care    Filed:  06/02/17 1222 Date of Service:  06/02/17 1216 Status:  Signed    :  Donovan Tate (Pastoral Care)           Patient: Harman [place] citizen    Date: 06/02/17  Time: 12:16 PM    \[facility]\""  Notes    Patient has been provided spiritual support from Both Father Antonio Torres and [facility]. During last encounter patient was not responsive.  Prayer offered at bedside. Signed by: Maxwell Enamorado       [facility] IDG Volunteer Notes by Yadira Galeano at 06/01/17 1348  Version 1 of 1    Author:  Yadira Galeano Service:  Shilpa Mas Author Type:  Hospice Volunteer/    Filed:  06/01/17 6760 Date of Service:  06/01/17 1349 Status:  Signed    :  Yadira Galeano (Hospice Volunteer/)               [facility] Interdisciplinary Plan of Care Review     Status Codes I = Initiated C=Continued R=Revised RS = Resolved     I.  Volunteer     Goal: Hospice house volunteer (s) enhances the quality of remaining life while patient is at the hospice house. Interventions: Kojosupriyafadia Sanpam Boofadia Lee Stephanie Rummage Volunteer (s) will provide companionship to the patient and/or family by visiting at the hospice house       . Mini Lee Stephanie Rummage Volunteer (s) will provide respite as needed when requested by patient and/or family. Mini Lee Farhad Muckle  Volunteer will provide activities such as music, reading, pet therapy, etc. as requested. Ceclia Jesus Farhad Muckle  Comfort bag delivered. Any other special requests or information regarding volunteer services:    Comfort bag delivered. No further needs identified at this time. These notes have been discussed in [facility] meeting. Signed by: Yadira Galeano       [facility] IDG Nurse Notes by Galina Hathaway at 05/30/17 6246  Version 1 of 1    Author:  Galina Hathaway Service:  Shilpa Mas Author Type:  Registered Nurse    Filed:  05/30/17 5062 Date of Service:  05/30/17 1347 Status:  Signed    :  Galina Hathaway (Registered Nurse)           Patient: Wayne Boxer    Date:05/29/2017  Time: 12:30 PM    [facility] Nurse Notes    UPDATE: Patient is a 8 year old Male, with history of falls and diagnosis of Metabolic Encephalopy. GIP for pain,, agitation and delirium symptom management. Poor IV access, and meds changed to subq. Morphine for pain and dyspnea and Ativan and Haldol for Agitation.            GOALS OF CARE:   Continue to monitor for optimal patient comfort and symptom management. Ongoing family support. Signed by: Tamia BENNETT [facility] IDG  Notes by America Richardson at 05/29/17 1204  Version 1 of 1    Author:  America Richardson Service:  Lamar Triana Author Type:      Filed:  05/29/17 1208 Date of Service:  05/29/17 1204 Status:  Signed    :  America Richardson ()           Patient: Allan Eisenmenger    Date: 05/29/17  Time: 12:04 PM    [facility]  Notes  LMSW will complete the initial SW assessment. Pt is a fall risk. Signed by: America Richardson       [facility] IDG Volunteer Notes by Hina Wagner at 05/29/17 1151  Version 1 of 1    Author:  Hina Wagner Service:  Lamar Triana Author Type:  Hospice Volunteer/    Filed:  05/29/17 1152 Date of Service:  05/29/17 1151 Status:  Signed    :  Hina Wagner (Hospice Volunteer/)               [facility] Interdisciplinary Plan of Care Review     Status Codes I = Initiated C=Continued R=Revised RS = Resolved     I.  Volunteer     Goal: Hospice house volunteer (s) enhances the quality of remaining life while patient is at the hospice house. Interventions: Gioia Spiegel Gioia Spiegel Leopoldo Carte Volunteer (s) will provide companionship to the patient and/or family by visiting at the hospice house       . Gioia Spiegel Leopoldo Carte Volunteer (s) will provide respite as needed when requested by patient and/or family. Kaye Bond  Volunteer will provide activities such as music, reading, pet therapy, etc. as requested. Kaye Bond  Comfort bag delivered. Any other special requests or information regarding volunteer services:    Comfort Bag delivered. Staff have asked for companionship visits to the family and volunteers have been notified. No further needs identified at this time. These notes have been discussed in [facility] meeting.           Signed by: Hina Wagner

## 2017-06-21 NOTE — HSPC IDG BEREAVEMENT NOTES
LATE ENTRY:    Patient death and family bereavement needs discussed at [facility]. Bereavement risk factors indicate a bereavement risk score of LOW . Bereavement follow up to be provided accordingly.

## 2017-10-16 NOTE — PROGRESS NOTES
Patient's daughter, Marbella Velasco updated via phone of patient's condition. Daughter would like for a  to come to facility for [address]. Patient is member at [facility], but daughter stated \"it is not necessary for a  for a  from [facility] to be notified. \" Pt's INR from Wednesday 10/11 was 4.5.    No one came to the house on Saturday 10/14 to do another blood draw. Pt was told this should have happened.

## 2020-09-03 NOTE — HSPC IDG SOCIAL WORKER NOTES
Patient: Olivia Og    Date: 06/02/17  Time: 12:19 PM    [facility]  Notes    LMSW will continue to provide emotional support to his family and pt. Family has chosen [facility]. Elvie Garcia has had last rights with Clark Martinez. Family is coping well.           Signed by: Angela gO 74
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
## 2022-02-10 NOTE — PROGRESS NOTES
Shift assessment complete via doc flowsheet. Pt alert and oriented to person, but disoriented to time, situation and place. HR irregular, S1S2. Respirations even and unlabored on room air. Lung sounds CTA bilaterally. Abdomen soft and intact. Bowel sounds active in all quadrants. Bunch catheter patent and draining yellow urine. FLACC pain scale 0/10. Pt resting in bed. Bed alarm on. Bed in low and locked position, side rails up x 3. Call light within reach. Pt instructed to call for assistance. No distress noted. No other needs expressed. Will continue to monitor. Previously Declined (within the last year)

## 2024-02-13 NOTE — PROGRESS NOTES
No care due was identified.  Health [facility] Embedded Care Due Messages. Reference number: [id number].   2/13/2024 8:50:58 AM CST   Problem: Interdisciplinary Rounds  Goal: Interdisciplinary Rounds  Interdisciplinary team rounds were held 5/24/2017 with the following team members:Care Management, Nursing, Nutrition, Patient Relations, Pharmacy, Physician and Physician's Assistant and the patient. Plan of care discussed. See clinical pathway and/or care plan for interventions and desired outcomes.